# Patient Record
Sex: FEMALE | Race: WHITE | NOT HISPANIC OR LATINO | Employment: FULL TIME | URBAN - METROPOLITAN AREA
[De-identification: names, ages, dates, MRNs, and addresses within clinical notes are randomized per-mention and may not be internally consistent; named-entity substitution may affect disease eponyms.]

---

## 2022-08-08 DIAGNOSIS — N91.2 AMENORRHEA: Primary | ICD-10-CM

## 2022-12-28 ENCOUNTER — TELEPHONE (OUTPATIENT)
Dept: OBGYN CLINIC | Facility: CLINIC | Age: 27
End: 2022-12-28

## 2023-03-01 ENCOUNTER — ANNUAL EXAM (OUTPATIENT)
Dept: OBGYN CLINIC | Facility: CLINIC | Age: 28
End: 2023-03-01

## 2023-03-01 VITALS
BODY MASS INDEX: 20.98 KG/M2 | HEIGHT: 62 IN | SYSTOLIC BLOOD PRESSURE: 110 MMHG | DIASTOLIC BLOOD PRESSURE: 70 MMHG | WEIGHT: 114 LBS

## 2023-03-01 DIAGNOSIS — Z01.419 WOMEN'S ANNUAL ROUTINE GYNECOLOGICAL EXAMINATION: Primary | ICD-10-CM

## 2023-03-01 DIAGNOSIS — N91.4 SECONDARY OLIGOMENORRHEA: ICD-10-CM

## 2023-03-01 RX ORDER — MEDROXYPROGESTERONE ACETATE 10 MG/1
10 TABLET ORAL DAILY
Qty: 5 TABLET | Refills: 3 | Status: SHIPPED | OUTPATIENT
Start: 2023-03-01 | End: 2023-03-02 | Stop reason: ALTCHOICE

## 2023-03-01 NOTE — PROGRESS NOTES
Gabbie Arevalo is a 28 y o  female who presents for annual well woman exam  Periods are irregular, lasting 5 days  No intermenstrual bleeding, spotting, or discharge  Current contraception: none  Trying to conceive      Oligomenorrhea noted, patient tried 1 month of Femara without success with pregnancy  Ultrasound findings reviewed and discussed with patient with possibility of polycystic ovarian syndrome  Option given for cyclic Provera, if not planning on conceiving oral contraceptive pills or if desires to conceive trial of ovulation induction patient desired expectant management for now and she is considering cyclic Provera for withdrawal bleeding      Menstrual History:  OB History        1    Para        Term                AB   1    Living   0       SAB        IAB        Ectopic        Multiple        Live Births                  Patient's last menstrual period was 2023 (exact date)  Period Pattern: (!) Irregular    The following portions of the patient's history were reviewed and updated as appropriate: allergies, current medications, past family history, past medical history, past social history, past surgical history and problem list     Review of Systems  Review of Systems   Constitutional: Negative for activity change, appetite change, chills, fatigue and fever  Respiratory: Negative for apnea, cough, chest tightness and shortness of breath  Cardiovascular: Negative for chest pain, palpitations and leg swelling  Gastrointestinal: Negative for abdominal pain, constipation, diarrhea, nausea and vomiting  Genitourinary: Negative for difficulty urinating, dysuria, flank pain, frequency, hematuria and urgency  Neurological: Negative for dizziness, seizures, syncope, light-headedness, numbness and headaches  Psychiatric/Behavioral: Negative for agitation and confusion            Objective      /70 (BP Location: Left arm, Patient Position: Sitting, Cuff Size: Adult)   Ht 5' 2" (1 575 m)   Wt 51 7 kg (114 lb)   LMP 01/26/2023 (Exact Date)   BMI 20 85 kg/m²     Physical Exam  OBGyn Exam     General:   alert and oriented, in no acute distress, alert, appears stated age and cooperative   Heart: regular rate and rhythm, S1, S2 normal, no murmur, click, rub or gallop   Lungs: clear to auscultation bilaterally   Abdomen: soft, non-tender, without masses or organomegaly   Vulva: normal   Vagina: normal mucosa, normal discharge   Cervix: no cervical motion tenderness and no lesions   Uterus: normal size   Adnexa:  Breast Exam:  normal adnexa  breasts appear normal, no suspicious masses, no skin or nipple changes or axillary nodes  Assessment      @well woman@   26-year-old female  Annual exam  Trying to conceive   Oligomenorrhea  Plan   Pap -2022  Lab results reviewed and discussed with patient  Ultrasound finding reviewed and discussed with patient  Management option for PCOS discussed with patient patient considering cyclic Provera desired expectant management she will try to conceive on her own for the next 3 months if no pregnancy she would call us back with her decision option would be trial of ovulation induction versus referral to [facility]   All questions answered  There are no Patient Instructions on file for this visit

## 2023-03-02 ENCOUNTER — OFFICE VISIT (OUTPATIENT)
Dept: FAMILY MEDICINE CLINIC | Facility: CLINIC | Age: 28
End: 2023-03-02

## 2023-03-02 VITALS
WEIGHT: 113.4 LBS | BODY MASS INDEX: 20.87 KG/M2 | HEART RATE: 76 BPM | SYSTOLIC BLOOD PRESSURE: 108 MMHG | OXYGEN SATURATION: 98 % | DIASTOLIC BLOOD PRESSURE: 67 MMHG | HEIGHT: 62 IN | TEMPERATURE: 97.9 F

## 2023-03-02 DIAGNOSIS — Z00.00 ANNUAL PHYSICAL EXAM: Primary | ICD-10-CM

## 2023-03-02 NOTE — PROGRESS NOTES
[facility]    NAME: Kristi Arias  AGE: 28 y o  SEX: female  : 1995     DATE: 3/2/2023     Assessment and Plan:     Problem List Items Addressed This Visit    None  Visit Diagnoses     Annual physical exam    -  Primary    Relevant Orders    CBC and differential    Comprehensive metabolic panel    Lipid panel          Immunizations and preventive care screenings were discussed with patient today  Appropriate education was printed on patient's after visit summary  Counseling:  · Injury prevention: discussed safety/seat belts, safety helmets, smoke detectors, carbon dioxide detectors, and smoking near bedding or upholstery  Return in about 1 year (around 3/2/2024)  Chief Complaint:     Chief Complaint   Patient presents with   • Annual Exam      History of Present Illness:     Adult Annual Physical   Patient here for a comprehensive physical exam  The patient reports problems - occ chest pain, factory work, no heavy lifting, but twisting and reaching; trying to conceive, lightheaded, runs low BP, some shakiness, picky eater, wonders about iron  fam hx largely unknown, family in [facility], don't go to doctors  5 siblings all healthy  Diet and Physical Activity  · Diet/Nutrition: well balanced diet and consuming 3-5 servings of fruits/vegetables daily  · Exercise: walking, moderate cardiovascular exercise, strength training exercises and 1-2 times a week on average  Depression Screening  PHQ-2/9 Depression Screening         General Health  · Sleep: sleeps well  · Hearing: normal - bilateral   · Vision: goes for regular eye exams and wears glasses  · Dental: regular dental visits  /GYN Health  · Last menstrual period: 23  · Contraceptive method: none, has been off the pill for 2 years, no menses x 1 year until last month    · History of STDs?: no      Review of Systems:     Review of Systems Constitutional: Negative for activity change, fatigue and fever  HENT: Negative for congestion  Eyes: Negative for visual disturbance  Respiratory: Negative for chest tightness and shortness of breath  Cardiovascular: Positive for chest pain (lasts for a few seconds)  Negative for palpitations  Gastrointestinal: Negative for abdominal pain  Genitourinary: Negative for difficulty urinating  Musculoskeletal: Negative for arthralgias  Neurological: Positive for tremors and light-headedness  Negative for headaches  Hematological: Does not bruise/bleed easily  Psychiatric/Behavioral: Negative for sleep disturbance  Past Medical History:     History reviewed  No pertinent past medical history  Past Surgical History:     History reviewed  No pertinent surgical history     Social History:     Social History     Socioeconomic History   • Marital status: Single     Spouse name: None   • Number of children: None   • Years of education: None   • Highest education level: None   Occupational History   • None   Tobacco Use   • Smoking status: Never   • Smokeless tobacco: Never   Vaping Use   • Vaping Use: Never used   Substance and Sexual Activity   • Alcohol use: Not Currently   • Drug use: Never   • Sexual activity: Yes     Partners: Male     Birth control/protection: None     Comment: trying to conceive   Other Topics Concern   • None   Social History Narrative   • None     Social Determinants of Health     Financial Resource Strain: Not on file   Food Insecurity: Not on file   Transportation Needs: Not on file   Physical Activity: Not on file   Stress: Not on file   Social Connections: Not on file   Intimate Partner Violence: Not on file   Housing Stability: Not on file      Family History:     Family History   Problem Relation Age of Onset   • No Known Problems Mother    • No Known Problems Father    • Dementia Paternal Grandmother    • Diabetes Paternal Grandmother    • Diabetes Paternal Grandfather       Current Medications:     No current outpatient medications on file  No current facility-administered medications for this visit  Allergies:     No Known Allergies   Physical Exam:     /67 (BP Location: Right arm, Patient Position: Sitting, Cuff Size: Standard)   Pulse 76   Temp 97 9 °F (36 6 °C)   Ht 5' 2" (1 575 m)   Wt 51 4 kg (113 lb 6 4 oz)   LMP 01/31/2023   SpO2 98%   BMI 20 74 kg/m²     Physical Exam  Constitutional:       Appearance: She is well-developed  HENT:      Right Ear: External ear normal       Left Ear: External ear normal    Eyes:      Conjunctiva/sclera: Conjunctivae normal       Pupils: Pupils are equal, round, and reactive to light  Cardiovascular:      Rate and Rhythm: Normal rate and regular rhythm  Heart sounds: Normal heart sounds  Pulmonary:      Effort: Pulmonary effort is normal       Breath sounds: Normal breath sounds  Abdominal:      General: Bowel sounds are normal       Palpations: Abdomen is soft  Musculoskeletal:         General: Normal range of motion  Cervical back: Normal range of motion and neck supple  Lymphadenopathy:      Cervical: No cervical adenopathy  Skin:     General: Skin is warm  Capillary Refill: Capillary refill takes less than 2 seconds  Neurological:      Mental Status: She is alert and oriented to person, place, and time  Psychiatric:         Behavior: Behavior normal          Thought Content:  Thought content normal          Judgment: Judgment normal           MD Nicanor Rose

## 2023-03-23 ENCOUNTER — APPOINTMENT (OUTPATIENT)
Dept: LAB | Facility: MEDICAL CENTER | Age: 28
End: 2023-03-23

## 2023-03-23 DIAGNOSIS — Z00.00 ANNUAL PHYSICAL EXAM: ICD-10-CM

## 2023-03-23 LAB
ALBUMIN SERPL BCP-MCNC: 4.2 G/DL (ref 3.5–5)
ALP SERPL-CCNC: 49 U/L (ref 46–116)
ALT SERPL W P-5'-P-CCNC: 16 U/L (ref 12–78)
ANION GAP SERPL CALCULATED.3IONS-SCNC: 3 MMOL/L (ref 4–13)
AST SERPL W P-5'-P-CCNC: 11 U/L (ref 5–45)
BASOPHILS # BLD AUTO: 0.07 THOUSANDS/ÂΜL (ref 0–0.1)
BASOPHILS NFR BLD AUTO: 1 % (ref 0–1)
BILIRUB SERPL-MCNC: 1.01 MG/DL (ref 0.2–1)
BUN SERPL-MCNC: 10 MG/DL (ref 5–25)
CALCIUM SERPL-MCNC: 9.3 MG/DL (ref 8.3–10.1)
CHLORIDE SERPL-SCNC: 107 MMOL/L (ref 96–108)
CHOLEST SERPL-MCNC: 178 MG/DL
CO2 SERPL-SCNC: 28 MMOL/L (ref 21–32)
CREAT SERPL-MCNC: 0.73 MG/DL (ref 0.6–1.3)
EOSINOPHIL # BLD AUTO: 0.1 THOUSAND/ÂΜL (ref 0–0.61)
EOSINOPHIL NFR BLD AUTO: 2 % (ref 0–6)
ERYTHROCYTE [DISTWIDTH] IN BLOOD BY AUTOMATED COUNT: 12.7 % (ref 11.6–15.1)
GFR SERPL CREATININE-BSD FRML MDRD: 112 ML/MIN/1.73SQ M
GLUCOSE P FAST SERPL-MCNC: 79 MG/DL (ref 65–99)
HCT VFR BLD AUTO: 43.2 % (ref 34.8–46.1)
HDLC SERPL-MCNC: 78 MG/DL
HGB BLD-MCNC: 14.6 G/DL (ref 11.5–15.4)
IMM GRANULOCYTES # BLD AUTO: 0.02 THOUSAND/UL (ref 0–0.2)
IMM GRANULOCYTES NFR BLD AUTO: 0 % (ref 0–2)
LDLC SERPL CALC-MCNC: 88 MG/DL (ref 0–100)
LYMPHOCYTES # BLD AUTO: 2.29 THOUSANDS/ÂΜL (ref 0.6–4.47)
LYMPHOCYTES NFR BLD AUTO: 43 % (ref 14–44)
MCH RBC QN AUTO: 29.6 PG (ref 26.8–34.3)
MCHC RBC AUTO-ENTMCNC: 33.8 G/DL (ref 31.4–37.4)
MCV RBC AUTO: 87 FL (ref 82–98)
MONOCYTES # BLD AUTO: 0.38 THOUSAND/ÂΜL (ref 0.17–1.22)
MONOCYTES NFR BLD AUTO: 7 % (ref 4–12)
NEUTROPHILS # BLD AUTO: 2.5 THOUSANDS/ÂΜL (ref 1.85–7.62)
NEUTS SEG NFR BLD AUTO: 47 % (ref 43–75)
NONHDLC SERPL-MCNC: 100 MG/DL
NRBC BLD AUTO-RTO: 0 /100 WBCS
PLATELET # BLD AUTO: 305 THOUSANDS/UL (ref 149–390)
PMV BLD AUTO: 10.2 FL (ref 8.9–12.7)
POTASSIUM SERPL-SCNC: 4.1 MMOL/L (ref 3.5–5.3)
PROT SERPL-MCNC: 7.2 G/DL (ref 6.4–8.4)
RBC # BLD AUTO: 4.94 MILLION/UL (ref 3.81–5.12)
SODIUM SERPL-SCNC: 138 MMOL/L (ref 135–147)
TRIGL SERPL-MCNC: 61 MG/DL
WBC # BLD AUTO: 5.36 THOUSAND/UL (ref 4.31–10.16)

## 2023-07-25 ENCOUNTER — TELEPHONE (OUTPATIENT)
Dept: DERMATOLOGY | Facility: CLINIC | Age: 28
End: 2023-07-25

## 2023-11-11 ENCOUNTER — AMB VIDEO VISIT (OUTPATIENT)
Dept: OTHER | Facility: HOSPITAL | Age: 28
End: 2023-11-11

## 2023-11-11 NOTE — CARE ANYWHERE EVISITS
Visit Summary for [facility] [place] . [facility] - Gender: Female - Date of Birth: [id number]  Date: [id number] - Duration: 11 minutes  Patient: [facility] [place] . [facility]  Provider: Pedro Batista    Patient Contact Information  Address  [address]; [address] [facility]  [phone]    Visit Topics  Eye problem [Added By: Self - 2023-11-11]    Triage Questions   What is your current physical address in the event of a medical emergency? Answer []  Are you allergic to any medications? Answer []  Are you now or could you be pregnant? Answer []  Do you have any immune system compromise or chronic lung   disease? Answer []  Do you have any vulnerable family members in the home (infant, pregnant, cancer, elderly)? Answer []     Conversation Transcripts  [0A][0A] [Notification] You are connected with Eva Delgadillo, Adult Medicine.[0A][Notification] Jeni Gonzalez is located in [address] has shared health history. Kailyn Loya .[0A][Notification] Eva Pablo has added a   diagnosis/procedure code. [0A][Notification] Eva Pablo has added a prescription. [0A]    Diagnosis  Hordeolum externum right eye, unspecified eyelid    Procedures    Medications Prescribed    erythromycin  Dose : 1 CM  Route : ophthalmic (eye)  Frequency : 4 times a day. Until directed to stop.      Refills : 0  Instructions to the Pharmacist : Substitutions allowed      Provider Notes  [0A][0A]  Mode of Communication:  Geraldine Alaniz verified name, date of birth and location[0A]History: States her eye is swollen and there is a bump in her lower inner eyelid; states this began two days; denies visual changes or visual loss; denies   discharge; it is painful when she touches it; she has not had this before; she is not pregnant or breast feeding[0A][0A]Past medical history: None[0A]Medications: None[0A]Allergies: None[0A]Last menstrual period/pregnant (if applicable): 1 ACGJU[3Z]FWCV:   [0A][0A]Vitals signs (if available): [0A][0A]Weight: 114 ibs and 5 foot 2[0A]General: Alert and oriented x 3; no acute distress; verbalizing [id number][6Z]OIURI: normocephalic, atraumatic, eomi, mucous membranes moist[0A]Resp: non labored   respirations, no audible wheezing[0A]Skin: no lesions or rashes[0A]Assessment: Hordeoleum of right lower eyelid[0A]Diagnosis code: [0A]Plan:  [0A]    Medications: [0A]Erythromycin ointment[0A]    Home care: [0A]Apply hot compresses several times per day;   wash eye with baby soap 15 times per day[0A]    Referral or follow up: [0A]If no improvement go to optometrist or urgent care[0A]    Medical decision making:  [0A]    Antibiotic choice (if applicable):   [5B]TQULOHAGUZ recommendations: [0A]    If you   received a prescription at this visit and you have a question or problem please call [phone] for prescription assistance [0A]    Please print a copy of this note and send it to your regular doctor, or take it to your next visit so it may be included   in your medical record [0A]    Please see your primary care provider on an annual basis or more frequently if directed [0A]The patient voiced understanding and agreement with plan. [0A]    Electronically signed by: Luisa Jordan(NPI [id number])

## 2023-11-11 NOTE — CARE ANYWHERE EVISITS
Visit Summary for [facility] [place] . [facility] - Gender: Female - Date of Birth: [id number]  Date: [id number] - Duration: 11 minutes  Patient: [facility] [place] . [facility]  Provider: Reinaldo Colindres    Patient Contact Information  Address  [address]; [address] [facility] [place]  [phone]    Visit Topics  Eye problem [Added By: Self - 2023-11-11]    Triage Questions   What is your current physical address in the event of a medical emergency? Answer []  Are you allergic to any medications? Answer []  Are you now or could you be pregnant? Answer []  Do you have any immune system compromise or chronic lung   disease? Answer []  Do you have any vulnerable family members in the home (infant, pregnant, cancer, elderly)? Answer []     Conversation Transcripts  [0A][0A] [Notification] You are connected with [facility]. Leona, Adult Medicine.[0A][Notification] Elvin Sanabria is located in [address] [place] [address] Road has shared health history. [facility] .[0A][Notification] [facility]. Shandra Espitia has added a   diagnosis/procedure code. [0A][Notification] [facility] Shandra Espitia has added a prescription. [0A]    Diagnosis  Hordeolum externum right eye, unspecified eyelid    Procedures    Medications Prescribed    erythromycin  Dose : 1 CM  Route : ophthalmic (eye)  Frequency : 4 times a day. Until directed to stop.      Refills : 0  Instructions to the Pharmacist : Substitutions allowed      Provider Notes  [0A][0A]        [0A] Mode of Communication:  Nataly Devoid verified name, date of birth and location[0A]History: States her eye is swollen and there is a bump in her lower inner eyelid; states this began two days; denies visual changes or visual loss;   denies discharge; it is painful when she touches it; she has not had this before; she is not pregnant or breast feeding[0A][0A]Past medical history: None[0A]Medications: None[0A]Allergies: None[0A]Last menstrual period/pregnant (if applicable): 1   [facility][0W]NLDI: [0A][0A]Vitals signs (if available):  [0A][0A]Weight: 114 ibs and 5 foot 2[0A]General: Alert and oriented x 3; no acute distress; verbalizing [id number][5M]IHUDK: normocephalic, atraumatic, eomi, mucous membranes moist[0A]Resp: non   labored respirations, no audible wheezing[0A]Skin: no lesions or rashes[0A]Assessment: Hordeoleum of right lower eyelid[0A]Diagnosis code: [0A]Plan:  [0A]    Medications: [0A]Erythromycin ointment[0A]    Home care: [0A]Apply hot compresses several times   per day; wash eye with baby soap 15 times per day[0A]    Referral or follow up: [0A]If no improvement go to optometrist or urgent care[0A]    Medical decision making:  [0A]    Antibiotic choice (if applicable):   [7G]TLPFZPMLCJ recommendations: [0A]      If you received a prescription at this visit and you have a question or problem please call [phone] for prescription assistance [0A]    Please print a copy of this note and send it to your regular doctor, or take it to your next visit so it may be   included in your medical record [0A]    Please see your primary care provider on an annual basis or more frequently if directed [0A]The patient voiced understanding and agreement with plan. [0A]      Addendum added on 11/11/2023 09:12 AM ESTPlan:   Prescription personally called in to [address] [place] [address] on 11/11/2023[0A]    Electronically signed by: Luisa Pal(NPI [id number])

## 2024-01-15 ENCOUNTER — TELEPHONE (OUTPATIENT)
Dept: OBGYN CLINIC | Facility: CLINIC | Age: 29
End: 2024-01-15

## 2024-01-16 DIAGNOSIS — N91.2 AMENORRHEA: Primary | ICD-10-CM

## 2024-03-07 ENCOUNTER — APPOINTMENT (OUTPATIENT)
Dept: LAB | Facility: MEDICAL CENTER | Age: 29
End: 2024-03-07
Payer: COMMERCIAL

## 2024-03-07 ENCOUNTER — ANNUAL EXAM (OUTPATIENT)
Dept: OBGYN CLINIC | Facility: MEDICAL CENTER | Age: 29
End: 2024-03-07
Payer: COMMERCIAL

## 2024-03-07 VITALS
HEIGHT: 62 IN | WEIGHT: 115 LBS | DIASTOLIC BLOOD PRESSURE: 60 MMHG | BODY MASS INDEX: 21.16 KG/M2 | SYSTOLIC BLOOD PRESSURE: 110 MMHG

## 2024-03-07 DIAGNOSIS — Z01.419 WELL WOMAN EXAM WITH ROUTINE GYNECOLOGICAL EXAM: Primary | ICD-10-CM

## 2024-03-07 DIAGNOSIS — N91.2 AMENORRHEA: ICD-10-CM

## 2024-03-07 PROCEDURE — S0612 ANNUAL GYNECOLOGICAL EXAMINA: HCPCS | Performed by: PHYSICIAN ASSISTANT

## 2024-03-07 NOTE — PROGRESS NOTES
Patient presents for a routine annual visit  Last Pap Smear- 2022 neg  HPV vaccine- completed   LMP- 24 - irregular   Birth control- none - trying to conceive     nonSmoker  Currently sexually active - one partner   STD testing - declines   No family history of uterine, ovarian, cervical or breast cancer  No concerns/questions for today's visit

## 2024-03-07 NOTE — PROGRESS NOTES
Assessment   28 y.o.  presenting for annual exam.     Plan   Diagnoses and all orders for this visit:    Well woman exam with routine gynecological exam        Pap up to date  TTC- continue to follow with SGF    Perineal hygiene reviewed. Weight bearing exercises minium of 150 mins/weekly advised. Kegel exercises recommended.   SBE encouraged, A yearly mammogram is recommended for breast cancer screening starting at age 40. ASCCP guidelines reviewed. Condoms encouraged with all sexual activity to prevent STI's. Gardisil vaccines recommended up to age 45. Calcium/ Vit D dietary requirements discussed.   Advised to call with any issues, all concerns & questions addressed.   See provided information in your after visit summary     F/U Annually and PRN    Results will be released to [facility], if abnormal will call or message to review and discuss treatment plan.     __________________________________________________________________    Subjective     Christian Ellis is a 28 y.o.  presenting for annual exam. She is without complaint and does not want STD testing today.     Completing initial blood work and work up with F.     Longstanding history of oligomenorrheic cycles. Cycles present every 30-90 days on average. States she went almost a full year without a menses in [id number]    SCREENING  Last Pap: 3/23/2022 negative      GYN  Sexually active: Yes - single partner - male  Contraception: TTC  Hx STI: denies     Hx Abnormal pap: denies  We reviewed ASCCP guidelines for Pap testing today.    Denies vaginal discharge, itching, odor, dyspareunia, pelvic pain and vulvar/vaginal symptoms      OB           Complaints: denies   Denies urgency, frequency, hematuria, leakage / change in stream, difficulty urinating.       BREAST  Complaints: denies   Denies: breast lump, breast tenderness, nipple discharge, skin color change, and skin lesion(s)      Pertinent Family Hx:   Family hx of breast cancer:  "no  Family hx of ovarian cancer: no  Family hx of colon cancer: no      GENERAL  PMH reviewed/updated and is as below.   Patient does follow with a PCP.    SOCIAL  Smoking: no  Alcohol:no  Drug: no  Occupation: lead at manufacturing company       History reviewed. No pertinent past medical history.    History reviewed. No pertinent surgical history.    No current outpatient medications on file.    No Known Allergies    Social History     Socioeconomic History    Marital status: /Civil Union     Spouse name: Not on file    Number of children: Not on file    Years of education: Not on file    Highest education level: Not on file   Occupational History    Not on file   Tobacco Use    Smoking status: Never    Smokeless tobacco: Never   Vaping Use    Vaping status: Never Used   Substance and Sexual Activity    Alcohol use: Never    Drug use: Never    Sexual activity: Yes     Partners: Male     Birth control/protection: None     Comment: trying to conceive   Other Topics Concern    Not on file   Social History Narrative    Not on file     Social Determinants of Health     Financial Resource Strain: Not on file   Food Insecurity: Not on file   Transportation Needs: Not on file   Physical Activity: Not on file   Stress: Not on file   Social Connections: Not on file   Intimate Partner Violence: Not on file   Housing Stability: Not on file       Review of Systems     ROS:  Constitutional: Negative for fatigue and unexpected weight change.   Respiratory: Negative for cough and shortness of breath.    Cardiovascular: Negative for chest pain and palpitations.   Gastrointestinal: Negative for abdominal pain and change in bowel habits  Breasts:  Negative, other than as noted above.   Genitourinary: Negative, other than as noted above.   Psychiatric: Negative for mood difficulties.      Objective      /60 (BP Location: Left arm, Patient Position: Sitting, Cuff Size: Standard)   Ht 5' 1.5\" (1.562 m)   Wt 52.2 kg (115 " lb)   LMP 02/19/2024 (Exact Date)   BMI 21.38 kg/m²     Physical Examination:    Patient appears well and is not in distress  Neck is supple without masses, no cervical or supraclavicular lymphadenopathy  Cardiovascular: regular rate and rhythm; no murmurs  Lungs: clear to auscultation bilaterally; no wheezes  Breasts are symmetrical without mass, tenderness, nipple discharge, skin changes or adenopathy.   Abdomen is soft and nontender without masses.   External genitals are normal without lesions or rashes.  Urethral meatus and urethra are normal  Bladder is normal to palpation  Vagina is normal without discharge or bleeding.   Cervix is normal without discharge or lesion.   Uterus is normal, mobile, nontender without palpable mass.  Adnexa are normal, nontender, without palpable mass.

## 2024-03-21 ENCOUNTER — APPOINTMENT (OUTPATIENT)
Dept: LAB | Facility: MEDICAL CENTER | Age: 29
End: 2024-03-21
Payer: COMMERCIAL

## 2024-03-21 DIAGNOSIS — Z01.83 ENCOUNTER FOR BLOOD TYPING: ICD-10-CM

## 2024-03-21 DIAGNOSIS — Z11.59 SCREENING EXAMINATION FOR RUBELLA: ICD-10-CM

## 2024-03-21 DIAGNOSIS — Z31.41 FERTILITY TESTING: ICD-10-CM

## 2024-03-21 DIAGNOSIS — Z11.4 SCREENING FOR HIV (HUMAN IMMUNODEFICIENCY VIRUS): ICD-10-CM

## 2024-03-21 DIAGNOSIS — E28.2 POLYCYSTIC OVARIAN DISEASE: ICD-10-CM

## 2024-03-21 DIAGNOSIS — Z13.0 SCREENING FOR IRON DEFICIENCY ANEMIA: ICD-10-CM

## 2024-03-21 DIAGNOSIS — Z11.3 SCREENING FOR STDS (SEXUALLY TRANSMITTED DISEASES): ICD-10-CM

## 2024-03-21 DIAGNOSIS — Z13.228 SCREENING FOR CYSTIC FIBROSIS: ICD-10-CM

## 2024-03-21 LAB
ABO GROUP BLD: NORMAL
ALBUMIN SERPL BCP-MCNC: 4.7 G/DL (ref 3.5–5)
ALP SERPL-CCNC: 43 U/L (ref 34–104)
ALT SERPL W P-5'-P-CCNC: 8 U/L (ref 7–52)
ANION GAP SERPL CALCULATED.3IONS-SCNC: 7 MMOL/L (ref 4–13)
AST SERPL W P-5'-P-CCNC: 13 U/L (ref 13–39)
BILIRUB SERPL-MCNC: 0.88 MG/DL (ref 0.2–1)
BUN SERPL-MCNC: 10 MG/DL (ref 5–25)
CALCIUM SERPL-MCNC: 9.3 MG/DL (ref 8.4–10.2)
CHLORIDE SERPL-SCNC: 105 MMOL/L (ref 96–108)
CHOLEST SERPL-MCNC: 175 MG/DL
CO2 SERPL-SCNC: 28 MMOL/L (ref 21–32)
CREAT SERPL-MCNC: 0.75 MG/DL (ref 0.6–1.3)
ERYTHROCYTE [DISTWIDTH] IN BLOOD BY AUTOMATED COUNT: 12.5 % (ref 11.6–15.1)
GFR SERPL CREATININE-BSD FRML MDRD: 108 ML/MIN/1.73SQ M
GLUCOSE P FAST SERPL-MCNC: 78 MG/DL (ref 65–99)
HBV SURFACE AG SER QL: NORMAL
HCT VFR BLD AUTO: 42.5 % (ref 34.8–46.1)
HDLC SERPL-MCNC: 68 MG/DL
HGB BLD-MCNC: 14.1 G/DL (ref 11.5–15.4)
HIV 1+2 AB+HIV1 P24 AG SERPL QL IA: NORMAL
HIV 2 AB SERPL QL IA: NORMAL
HIV1 AB SERPL QL IA: NORMAL
HIV1 P24 AG SERPL QL IA: NORMAL
INSULIN SERPL-ACNC: 3.6 UIU/ML (ref 1.9–23)
LDLC SERPL CALC-MCNC: 89 MG/DL (ref 0–100)
MCH RBC QN AUTO: 30 PG (ref 26.8–34.3)
MCHC RBC AUTO-ENTMCNC: 33.2 G/DL (ref 31.4–37.4)
MCV RBC AUTO: 90 FL (ref 82–98)
NONHDLC SERPL-MCNC: 107 MG/DL
PLATELET # BLD AUTO: 322 THOUSANDS/UL (ref 149–390)
PMV BLD AUTO: 10.1 FL (ref 8.9–12.7)
POTASSIUM SERPL-SCNC: 4.1 MMOL/L (ref 3.5–5.3)
PROLACTIN SERPL-MCNC: 8.63 NG/ML (ref 3.34–26.72)
PROT SERPL-MCNC: 6.8 G/DL (ref 6.4–8.4)
RBC # BLD AUTO: 4.7 MILLION/UL (ref 3.81–5.12)
RH BLD: POSITIVE
RUBV IGG SERPL IA-ACNC: 32.4 IU/ML
SODIUM SERPL-SCNC: 140 MMOL/L (ref 135–147)
TREPONEMA PALLIDUM IGG+IGM AB [PRESENCE] IN SERUM OR PLASMA BY IMMUNOASSAY: NORMAL
TRIGL SERPL-MCNC: 89 MG/DL
TSH SERPL DL<=0.05 MIU/L-ACNC: 1.97 UIU/ML (ref 0.45–4.5)
VZV IGG SER QL IA: NORMAL
WBC # BLD AUTO: 5.27 THOUSAND/UL (ref 4.31–10.16)

## 2024-03-21 PROCEDURE — 86787 VARICELLA-ZOSTER ANTIBODY: CPT

## 2024-03-21 PROCEDURE — 86901 BLOOD TYPING SEROLOGIC RH(D): CPT

## 2024-03-21 PROCEDURE — 87491 CHLMYD TRACH DNA AMP PROBE: CPT

## 2024-03-21 PROCEDURE — 84443 ASSAY THYROID STIM HORMONE: CPT

## 2024-03-21 PROCEDURE — 84403 ASSAY OF TOTAL TESTOSTERONE: CPT

## 2024-03-21 PROCEDURE — 83036 HEMOGLOBIN GLYCOSYLATED A1C: CPT

## 2024-03-21 PROCEDURE — 83525 ASSAY OF INSULIN: CPT

## 2024-03-21 PROCEDURE — 82166 ASSAY ANTI-MULLERIAN HORM: CPT

## 2024-03-21 PROCEDURE — 84402 ASSAY OF FREE TESTOSTERONE: CPT

## 2024-03-21 PROCEDURE — 86803 HEPATITIS C AB TEST: CPT

## 2024-03-21 PROCEDURE — 84146 ASSAY OF PROLACTIN: CPT

## 2024-03-21 PROCEDURE — 80053 COMPREHEN METABOLIC PANEL: CPT

## 2024-03-21 PROCEDURE — 80061 LIPID PANEL: CPT

## 2024-03-21 PROCEDURE — 36415 COLL VENOUS BLD VENIPUNCTURE: CPT

## 2024-03-21 PROCEDURE — 85027 COMPLETE CBC AUTOMATED: CPT

## 2024-03-21 PROCEDURE — 82627 DEHYDROEPIANDROSTERONE: CPT

## 2024-03-21 PROCEDURE — 86900 BLOOD TYPING SEROLOGIC ABO: CPT

## 2024-03-21 PROCEDURE — 87389 HIV-1 AG W/HIV-1&-2 AB AG IA: CPT

## 2024-03-21 PROCEDURE — 87340 HEPATITIS B SURFACE AG IA: CPT

## 2024-03-21 PROCEDURE — 86762 RUBELLA ANTIBODY: CPT

## 2024-03-21 PROCEDURE — 83498 ASY HYDROXYPROGESTERONE 17-D: CPT

## 2024-03-21 PROCEDURE — 87591 N.GONORRHOEAE DNA AMP PROB: CPT

## 2024-03-21 PROCEDURE — 86780 TREPONEMA PALLIDUM: CPT

## 2024-03-22 LAB
C TRACH DNA SPEC QL NAA+PROBE: NEGATIVE
DHEA-S SERPL-MCNC: 179 UG/DL (ref 84.8–378)
EST. AVERAGE GLUCOSE BLD GHB EST-MCNC: 74 MG/DL
HBA1C MFR BLD: 4.2 %
HCV AB S/CO SERPL IA: NON REACTIVE
N GONORRHOEA DNA SPEC QL NAA+PROBE: NEGATIVE
SL AMB INTERPRETATION: NORMAL
TESTOST FREE SERPL-MCNC: 1.7 PG/ML (ref 0–4.2)
TESTOST SERPL-MCNC: 57 NG/DL (ref 13–71)

## 2024-03-25 LAB
17OHP SERPL-MCNC: 99 NG/DL
MIS SERPL-MCNC: 8.48 NG/ML

## 2024-04-04 ENCOUNTER — APPOINTMENT (OUTPATIENT)
Dept: LAB | Facility: MEDICAL CENTER | Age: 29
End: 2024-04-04
Payer: COMMERCIAL

## 2024-04-04 ENCOUNTER — OFFICE VISIT (OUTPATIENT)
Dept: FAMILY MEDICINE CLINIC | Facility: CLINIC | Age: 29
End: 2024-04-04
Payer: COMMERCIAL

## 2024-04-04 VITALS
BODY MASS INDEX: 21.02 KG/M2 | HEIGHT: 62 IN | DIASTOLIC BLOOD PRESSURE: 60 MMHG | OXYGEN SATURATION: 99 % | WEIGHT: 114.2 LBS | HEART RATE: 82 BPM | TEMPERATURE: 97.6 F | SYSTOLIC BLOOD PRESSURE: 108 MMHG

## 2024-04-04 DIAGNOSIS — G89.29 CHRONIC MIDLINE LOW BACK PAIN WITHOUT SCIATICA: Primary | ICD-10-CM

## 2024-04-04 DIAGNOSIS — M54.50 CHRONIC MIDLINE LOW BACK PAIN WITHOUT SCIATICA: Primary | ICD-10-CM

## 2024-04-04 DIAGNOSIS — B36.0 TINEA VERSICOLOR: ICD-10-CM

## 2024-04-04 DIAGNOSIS — Z31.41 FERTILITY TESTING: ICD-10-CM

## 2024-04-04 DIAGNOSIS — M72.2 PLANTAR FASCIITIS, BILATERAL: ICD-10-CM

## 2024-04-04 LAB — BLD GP AB SCN SERPL QL: NEGATIVE

## 2024-04-04 PROCEDURE — 86850 RBC ANTIBODY SCREEN: CPT

## 2024-04-04 PROCEDURE — 99214 OFFICE O/P EST MOD 30 MIN: CPT | Performed by: FAMILY MEDICINE

## 2024-04-04 PROCEDURE — 36415 COLL VENOUS BLD VENIPUNCTURE: CPT

## 2024-04-04 RX ORDER — ITRACONAZOLE 100 MG/1
200 CAPSULE ORAL DAILY
Qty: 28 CAPSULE | Refills: 0 | Status: SHIPPED | OUTPATIENT
Start: 2024-04-04 | End: 2024-04-04 | Stop reason: SDUPTHER

## 2024-04-04 RX ORDER — ITRACONAZOLE 100 MG/1
200 CAPSULE ORAL DAILY
Qty: 14 CAPSULE | Refills: 0 | Status: SHIPPED | OUTPATIENT
Start: 2024-04-04 | End: 2024-04-11

## 2024-04-04 NOTE — PROGRESS NOTES
Assessment/Plan:    1. Chronic midline low back pain without sciatica    2. Plantar fasciitis, bilateral    3. Tinea versicolor  -     itraconazole (SPORANOX) 100 mg capsule; Take 2 capsules (200 mg total) by mouth daily for 7 days        There are no Patient Instructions on file for this visit.    No follow-ups on file.    Subjective:      Patient ID: Christian Ellis is a 28 y.o. female.    Chief Complaint   Patient presents with    Back Pain     Needs a note for work       Here for note from work. Her dept recently took away their chairs 4 days ago. She works for manufacturing company. She is being asked to stand on her feet for 12 hours, 3-4 days a week. She has no issues with work productivity, the move was in response to other employees not finishing tasks. No more employees are calling out and the job is even more demanding. Notes pain in her back and in her heels, especially when standing for the first time in the morning. Will be starting IUI hopefully in the next few months. Notes rash on the belly that does not tan,  has similar lesions and they have a dog. No scaling.     Back Pain  This is a new problem. The current episode started in the past 7 days. The problem occurs intermittently. The problem has been waxing and waning since onset. The pain is present in the lumbar spine. The quality of the pain is described as aching and cramping. The pain does not radiate. The pain is at a severity of 5/10. The pain is Worse during the day. The symptoms are aggravated by standing. Stiffness is present All day. Pertinent negatives include no abdominal pain, bladder incontinence, bowel incontinence, chest pain, dysuria, fever, headaches, leg pain, numbness, paresis, paresthesias, pelvic pain, perianal numbness, tingling, weakness or weight loss.       The following portions of the patient's history were reviewed and updated as appropriate: allergies, current medications, past family history, past medical  "history, past social history, past surgical history and problem list.    Review of Systems   Constitutional:  Negative for fatigue, fever and weight loss.   HENT:  Negative for congestion.    Eyes:  Negative for visual disturbance.   Respiratory:  Negative for chest tightness and shortness of breath.    Cardiovascular:  Negative for chest pain and palpitations.   Gastrointestinal:  Negative for abdominal pain and bowel incontinence.   Genitourinary:  Negative for bladder incontinence, difficulty urinating, dysuria and pelvic pain.   Musculoskeletal:  Positive for back pain. Negative for arthralgias.        Heel pain   Skin:  Positive for rash.   Neurological:  Negative for tingling, weakness, numbness, headaches and paresthesias.   Hematological:  Does not bruise/bleed easily.         Current Outpatient Medications   Medication Sig Dispense Refill    itraconazole (SPORANOX) 100 mg capsule Take 2 capsules (200 mg total) by mouth daily for 7 days 14 capsule 0     No current facility-administered medications for this visit.       Objective:    /60 (BP Location: Left arm, Patient Position: Sitting, Cuff Size: Standard)   Pulse 82   Temp 97.6 °F (36.4 °C) (Temporal)   Ht 5' 1.5\" (1.562 m)   Wt 51.8 kg (114 lb 3.2 oz)   LMP 02/19/2024 (Exact Date)   SpO2 99%   BMI 21.23 kg/m²        Physical Exam  Vitals reviewed.   Constitutional:       Appearance: Normal appearance. She is well-developed.   Cardiovascular:      Rate and Rhythm: Normal rate.   Pulmonary:      Effort: Pulmonary effort is normal.   Musculoskeletal:         General: No tenderness.      Right lower leg: No edema.      Left lower leg: No edema.      Comments: No heel tenderness elicited on exam, strength intact   Skin:     General: Skin is warm.   Neurological:      General: No focal deficit present.      Mental Status: She is alert and oriented to person, place, and time.   Psychiatric:         Mood and Affect: Mood normal.         Behavior: " Behavior normal.         Thought Content: Thought content normal.         Judgment: Judgment normal.                Anni Yen MD

## 2024-05-16 ENCOUNTER — RA CDI HCC (OUTPATIENT)
Dept: OTHER | Facility: HOSPITAL | Age: 29
End: 2024-05-16

## 2024-05-16 NOTE — PROGRESS NOTES
HCC coding opportunities       Chart reviewed, no opportunity found: CHART REVIEWED, NO OPPORTUNITY FOUND        Patients Insurance        Commercial Insurance: [employer]       
DC instructions

## 2024-06-24 ENCOUNTER — TELEPHONE (OUTPATIENT)
Age: 29
End: 2024-06-24

## 2024-06-24 NOTE — TELEPHONE ENCOUNTER
Established pt called reporting new pregnancy. Pt sees fertility specialist & conceived via IUI, WINSTON 2/8/25; pt released by fertility clinic as of 7/10. Pt requests to establish ob care with [facility], advised pt will be seen by all delivering physicians & AP's, pt verbalized understanding.   No Dating appts available until 7/22 at [facility]. Attempted warm transfer. Please reach out to pt if sooner appt can be made since pt will >11w at that time or if ob Intake is preferred first visit.

## 2024-07-18 ENCOUNTER — APPOINTMENT (OUTPATIENT)
Dept: LAB | Facility: MEDICAL CENTER | Age: 29
End: 2024-07-18
Payer: COMMERCIAL

## 2024-07-18 DIAGNOSIS — N97.9 PRIMARY FEMALE INFERTILITY: ICD-10-CM

## 2024-07-18 LAB — B-HCG SERPL-ACNC: 812.5 MIU/ML (ref 0–5)

## 2024-07-18 PROCEDURE — 84702 CHORIONIC GONADOTROPIN TEST: CPT

## 2024-07-18 PROCEDURE — 36415 COLL VENOUS BLD VENIPUNCTURE: CPT

## 2024-07-25 ENCOUNTER — APPOINTMENT (OUTPATIENT)
Dept: LAB | Facility: MEDICAL CENTER | Age: 29
End: 2024-07-25
Payer: COMMERCIAL

## 2024-07-25 DIAGNOSIS — N97.9 PRIMARY FEMALE INFERTILITY: ICD-10-CM

## 2024-07-25 LAB — B-HCG SERPL-ACNC: 109.3 MIU/ML (ref 0–5)

## 2024-07-25 PROCEDURE — 36415 COLL VENOUS BLD VENIPUNCTURE: CPT

## 2024-07-25 PROCEDURE — 84702 CHORIONIC GONADOTROPIN TEST: CPT

## 2024-08-01 ENCOUNTER — APPOINTMENT (OUTPATIENT)
Dept: LAB | Facility: MEDICAL CENTER | Age: 29
End: 2024-08-01
Payer: COMMERCIAL

## 2024-08-01 DIAGNOSIS — N97.9 PRIMARY FEMALE INFERTILITY: ICD-10-CM

## 2024-08-01 LAB — B-HCG SERPL-ACNC: 30.3 MIU/ML (ref 0–5)

## 2024-08-01 PROCEDURE — 36415 COLL VENOUS BLD VENIPUNCTURE: CPT

## 2024-08-01 PROCEDURE — 84702 CHORIONIC GONADOTROPIN TEST: CPT

## 2024-08-08 ENCOUNTER — APPOINTMENT (OUTPATIENT)
Dept: LAB | Facility: MEDICAL CENTER | Age: 29
End: 2024-08-08
Payer: COMMERCIAL

## 2024-08-08 DIAGNOSIS — N97.9 PRIMARY FEMALE INFERTILITY: ICD-10-CM

## 2024-08-08 LAB — B-HCG SERPL-ACNC: 18.3 MIU/ML (ref 0–5)

## 2024-08-08 PROCEDURE — 36415 COLL VENOUS BLD VENIPUNCTURE: CPT

## 2024-08-08 PROCEDURE — 84702 CHORIONIC GONADOTROPIN TEST: CPT

## 2024-08-15 ENCOUNTER — APPOINTMENT (OUTPATIENT)
Dept: LAB | Facility: MEDICAL CENTER | Age: 29
End: 2024-08-15
Payer: COMMERCIAL

## 2024-08-15 DIAGNOSIS — N97.9 PRIMARY FEMALE INFERTILITY: ICD-10-CM

## 2024-08-15 LAB — B-HCG SERPL-ACNC: 10.5 MIU/ML (ref 0–5)

## 2024-08-15 PROCEDURE — 36415 COLL VENOUS BLD VENIPUNCTURE: CPT

## 2024-08-15 PROCEDURE — 84702 CHORIONIC GONADOTROPIN TEST: CPT

## 2024-08-27 ENCOUNTER — APPOINTMENT (OUTPATIENT)
Dept: LAB | Facility: MEDICAL CENTER | Age: 29
End: 2024-08-27
Payer: COMMERCIAL

## 2024-08-27 DIAGNOSIS — N97.9 PRIMARY FEMALE INFERTILITY: ICD-10-CM

## 2024-08-27 LAB — B-HCG SERPL-ACNC: 4.6 MIU/ML (ref 0–5)

## 2024-08-27 PROCEDURE — 36415 COLL VENOUS BLD VENIPUNCTURE: CPT

## 2024-08-27 PROCEDURE — 84702 CHORIONIC GONADOTROPIN TEST: CPT

## 2024-11-18 ENCOUNTER — TELEPHONE (OUTPATIENT)
Age: 29
End: 2024-11-18

## 2024-11-26 ENCOUNTER — INITIAL PRENATAL (OUTPATIENT)
Age: 29
End: 2024-11-26

## 2024-11-26 ENCOUNTER — TELEPHONE (OUTPATIENT)
Age: 29
End: 2024-11-26

## 2024-11-26 ENCOUNTER — PATIENT MESSAGE (OUTPATIENT)
Age: 29
End: 2024-11-26

## 2024-11-26 VITALS
WEIGHT: 125 LBS | HEART RATE: 80 BPM | BODY MASS INDEX: 23 KG/M2 | HEIGHT: 62 IN | SYSTOLIC BLOOD PRESSURE: 111 MMHG | DIASTOLIC BLOOD PRESSURE: 73 MMHG

## 2024-11-26 DIAGNOSIS — Z31.430 ENCOUNTER OF FEMALE FOR TESTING FOR GENETIC DISEASE CARRIER STATUS FOR PROCREATIVE MANAGEMENT: ICD-10-CM

## 2024-11-26 DIAGNOSIS — Z36.9 ENCOUNTER FOR ANTENATAL SCREENING: ICD-10-CM

## 2024-11-26 DIAGNOSIS — Z34.81 PRENATAL CARE, SUBSEQUENT PREGNANCY, FIRST TRIMESTER: Primary | ICD-10-CM

## 2024-11-26 DIAGNOSIS — E28.2 PCOS (POLYCYSTIC OVARIAN SYNDROME): ICD-10-CM

## 2024-11-26 PROCEDURE — OBC

## 2024-11-26 RX ORDER — PROGESTERONE 200 MG/1
200 CAPSULE ORAL
COMMUNITY
End: 2024-11-29

## 2024-11-26 NOTE — TELEPHONE ENCOUNTER
Patient has referral for MFM states she has a  small subchorionic hematoma and needs to be seen sooner than later.  Patient requesting [place] and a female .

## 2024-11-26 NOTE — PROGRESS NOTES
OB INTAKE INTERVIEW  Patient is 29 y.o. who presents for OB intake at 9 4/7wks  She is accompanied by herself during this encounter  The father of her baby Jeovanny Ellis is involved in the pregnancy and is 28 years old.      Last Menstrual Period: Unknown THIS IS AN IUI PREGNANCY. Date of IUI 10/4/2024  Ultrasound: Measured 8 weeks 6 days on 24  Estimated Date of Delivery: 2025 confirmed by 8 week US at [place].    Signs/Symptoms of Pregnancy  Current pregnancy symptoms: Nausea   Constipation YES  Headaches no  Cramping/spotting YES- sometimes mild cramps  PICA cravings no    Diabetes-  Body mass index is 22.86 kg/m².  If patient has 1 or more, please order early 1 hour GTT  History of GDM no  BMI >35 no  History of PCOS or current metformin use YES  History of LGA/macrosomic infant (4000g/9lbs) no    If patient has 2 or more, please order early 1 hour GTT  BMI>30 no  AMA no  First degree relative with type 2 diabetes no  History of chronic HTN, hyperlipidemia, elevated A1C no  High risk race (, , ,  or ) no    Hypertension- if you answer yes to any of the following, please order baseline preeclampsia labs (cbc, comprehensive metabolic panel, urine protein creatinine ratio, uric acid)  History of of chronic HTN no  History of gestational HTN no  History of preeclampsia, eclampsia, or HELLP syndrome no  History of diabetes no  History of lupus,sjogrens syndrome, kidney disease no    Thyroid- if yes order TSH with reflex T4  History of thyroid disease no    Bleeding Disorder or Hx of DVT-patient or first degree relative with history of. Order the following if not done previously.   (Factor V, antithrombin III, prothrombin gene mutation, protein C and S Ag, lupus anticoagulant, anticardiolipin, beta-2 glycoprotein)   no    OB/GYN-  History of abnormal pap smear no       Date of last pap smear 3/23/22  History of HPV no  History of  Herpes/HSV no  History of other STI (gonorrhea, chlamydia, trich) no  History of prior  no  History of prior  no  History of  delivery prior to 36 weeks 6 days no  History of Varicella or Vaccination Had varicella  History of blood transfusion no  Ok for blood transfusion YES    Substance screening-   History of tobacco use no  Currently using tobacco no  Substance Use Screen Level (N/A, LOW, HIGH) N/A    MRSA Screening-   Does the pt have a hx of MRSA? no    Immunizations:  Influenza vaccine given this season Not interested  Discussed Tdap vaccine YES  Discussed COVID Vaccine YES    Genetic/MFM-  Do you or your partner have a history of any of the following in yourselves or first degree relatives?  Cystic fibrosis no  Spinal muscular atrophy YES- FOB's nephew has SMA and FOB's 2 sisters and niece are carriers. FOB is negative.  Hemoglobinopathy/Sickle Cell/Thalassemia no  Fragile X Intellectual Disability no    If yes, discuss Carrier Screening and recommend consultation with MFM/Genetic Counseling and place specific [facility] Referral for.    If no, discuss Carrier Screening being completed once in a lifetime as a standard of care lab test. Place orders for Cystic Fibrosis Gene Test (ROF088) and Spinal Muscular Atrophy DNA ([id number])      Appointment for Nuchal Translucency Ultrasound at [facility] scheduled for Referral was placed and Patient has phone number to call as well.      Interview education  [facility] Pregnancy Essentials Book reviewed, discussed and attached to their AVS YES    Nurse/Family Partnership- patient may qualify NO; referral placed NO    Prenatal lab work scripts YES  Extra labs ordered:  CF and SMA screening  1 hr GTT    Aspirin/Preeclampsia Screen    Risk Level Risk Factor Recommendation   LOW Prior Uncomplicated full-term delivery no No Aspirin recommendation        MODERATE Nulliparity YES Recommend low-dose aspirin if     BMI>30 no 2 or more moderate risk factors    Family History  Preeclampsia (mother/sister) no     35yr old or greater no     Black Race, Concern for SDOH/Low Socioeconomic no     IVF Pregnancy  no     Personal History Risks (low birth weight, prior adverse preg outcome, >10yr preg interval) no         HIGH History of Preeclampsia no Recommend low-dose aspirin if     Multifetal gestation no 1 or more high risk factors    Chronic HTN no     Type 1 or 2 Diabetes no     Renal Disease no     Autoimmune Disease  no      Contraindications to ASA therapy:  NSAID/ ASA allergy: no  Nasal polyps: no  Asthma with history of ASA induced bronchospasm: no  Relative contraindications:  History of GI bleed: no  Active peptic ulcer disease: no  Severe hepatic dysfunction: no    Patient should be recommended to take ASA 162mg during this pregnancy from 12-36wks to lower her risk of preeclampsia: N/A          The patient has a history now or in prior pregnancy notable for:  IUI pregnancy, PCOS, Placental subchorionic hematoma. EPDS-0      Details that I feel the provider should be aware of: This is a planned and welcomed pregnancy for parents. This pregnancy is an IUI on 10/4/24. Patient states at [place] she was told she has a small placental subchorionic hematoma that may resolve on its own. Patient states per [place] she is to refrain from intercourse until cleared at next US . [facility] referral was placed and Patient has M office number as well to have US scheduled asap. This Nurse also called MFM and spoke with Ftar who will call Patient to schedule US.1 Hr GTT was ordered for PCOS. Patient is experiencing some nausea. OTC medications and diet were discussed. Patient knows to call OB for symptoms and how to contact her nurse navigator. Patient was made aware to have lab orders completed before next appointment. Patient denies any questions at this point and verbalizes understanding.         PN1 visit scheduled. The patient was oriented to our practice, the navigator role,  reviewed delivering physicians and [facility] for Delivery. All questions were answered.    Interviewed by: Sapphire Rodriguez RN

## 2024-11-26 NOTE — PATIENT INSTRUCTIONS
Congratulations!! Please review our Pregnancy Essential Guide and [facility] L&D Virtual tour from our networks website.     [facility] Pregnancy Essentials Guide  [facility] ([email or web address])     [facility] ([employer])

## 2024-11-27 ENCOUNTER — TELEPHONE (OUTPATIENT)
Age: 29
End: 2024-11-27

## 2024-11-27 NOTE — TELEPHONE ENCOUNTER
Patient wanted to know why 12/5/24 ob intake canceled. On closer review appears appointment canceled by patient via [employer] web service.  Called [facility], spoke with Elzbieta for another appointment.  Schedule needs to be review and will call patient back with an appointment.  Message relayed to patient.

## 2024-11-27 NOTE — TELEPHONE ENCOUNTER
Patient scheduled for 12/13/24.  Concerned that she needs to be sooner due to small subchorionic hematoma .

## 2024-11-29 ENCOUNTER — TELEPHONE (OUTPATIENT)
Facility: HOSPITAL | Age: 29
End: 2024-11-29

## 2024-11-29 NOTE — TELEPHONE ENCOUNTER
"----- Message from Jaydon Tang MD sent at 11/27/2024  3:11 PM EST -----  Ruben Harrison,    We can see her within the next week if there is an opening.  Chart says she wants to see a female physician.    Thanks,    Joel  ----- Message -----  From: Juliana Reyes  Sent: 11/27/2024   1:20 PM EST  To: Jaydon Tang MD    Hi Dr. Tang! We received a referral for this PT and it stated     \"Patient with IUI 10/4/2024 from [facility]. WINSTON 6/27/2025.  Patient states Per [place] she has a small subchorionic hematoma and needs to be seen sooner than later.\"     She is scheduled for her NT on 12/13 and would like to know if she needs to be seen sooner than this, or if it is okay to evaluate the hematoma at this appt. Could you pls look over it and let me know if we should get her in sooner pls? Thanks in advance!  "

## 2024-12-02 NOTE — PATIENT COMMUNICATION
Patient called back, no openings seen for Initial OB. Advised to send a message to clerical for scheduling. Please advise.

## 2024-12-03 ENCOUNTER — TELEPHONE (OUTPATIENT)
Facility: HOSPITAL | Age: 29
End: 2024-12-03

## 2024-12-03 NOTE — TELEPHONE ENCOUNTER
Pt called back  and said  no one has called her yet  to reschedule  Initial OB  appointment and is requesting a call back

## 2024-12-05 ENCOUNTER — INITIAL PRENATAL (OUTPATIENT)
Age: 29
End: 2024-12-05

## 2024-12-05 VITALS — WEIGHT: 122.4 LBS | SYSTOLIC BLOOD PRESSURE: 110 MMHG | DIASTOLIC BLOOD PRESSURE: 62 MMHG | BODY MASS INDEX: 22.39 KG/M2

## 2024-12-05 DIAGNOSIS — O46.8X1 SUBCHORIONIC HEMATOMA IN FIRST TRIMESTER, SINGLE OR UNSPECIFIED FETUS: ICD-10-CM

## 2024-12-05 DIAGNOSIS — Z34.81 ENCOUNTER FOR SUPERVISION OF OTHER NORMAL PREGNANCY, FIRST TRIMESTER: Primary | ICD-10-CM

## 2024-12-05 DIAGNOSIS — O41.8X10 SUBCHORIONIC HEMATOMA IN FIRST TRIMESTER, SINGLE OR UNSPECIFIED FETUS: ICD-10-CM

## 2024-12-05 DIAGNOSIS — Z11.3 SCREENING FOR STD (SEXUALLY TRANSMITTED DISEASE): ICD-10-CM

## 2024-12-05 PROCEDURE — 87591 N.GONORRHOEAE DNA AMP PROB: CPT

## 2024-12-05 PROCEDURE — 87491 CHLMYD TRACH DNA AMP PROBE: CPT

## 2024-12-05 NOTE — PROGRESS NOTES
Name: Christian Ellis      : 1995      MRN: [id number]  Encounter Provider: Eva Duncan PA-C  Encounter Date: 2024   Encounter department: [facility] [place]  :  Assessment & Plan  Encounter for supervision of other normal pregnancy, first trimester    Patient should call if she experiences: vaginal bleeding, change in discharge, LOF, contractions or dysuria.  Discussed that pregnancy can increase their risk for blood clots. Discussed the warning signs: acute SOB, calf/leg pain, chest pain  Initial PNV labs ordered, not completed. Urged patient to complete these  Flu shot declined  Continue PNV  Discussed the importance of a well balanced diet and discussed foods to avoid/limit during pregnancy  Patient can continue to exercise  Patient encouraged to develop a habit of sleeping on their side. Discussed with patient that as baby gets bigger, laying on the back can cause a disruption to the blood flow for mother and baby.  Patient should avoid hot tubs or saunas during the first trimester due to the increased risk of neural tube defects  Patient feels safe at home  Seat belt use discussed  Childbirth classes/hospital facilities discussed  Patient interested in NIPS  Hydration and tylenol PRN headaches  RTO 4 weeks or sooner for prenatal visit    Screening for STD (sexually transmitted disease)    Orders:    Chlamydia/GC amplified DNA by PCR    Subchorionic hematoma in first trimester, single or unspecified fetus    Patient seeing [facility] for follow up ultrasound  Patient has been following instruction to avoid intercourse until follow up U/S  Denies VB      History of Present Illness     HPI    Christian Ellis is a 29 y.o.  here for OB visit at 10w5d weeks. TWG  4.99 kg (11 lb).  No health concerns.  Patient had an IUI on 10/4/24. 25 WINSTON confirmed at 8 week U/S with [facility].    She denies nausea/vomiting or bleeding/cramping/contractions.  She notes occasionally having headaches but they improve quickly own their own.    Prenatal labs: not completed    Scheduled with M 12/6/24 for U/S regarding MOISES    OB history: 1 IAB 2014, 1 SAB July 2024 9weeks. Current pregnancy IUI.    GYN history: Last pap smear 3/23/22 NILM. No history of STDs.     Past medical history: No    Past surgical history: No    Social history:  Denies tobacco, alcohol, or illicit drug use.    Family history:   DVT/PE: none  Cancer: none  Heart disease: none  Stroke: none    Review of Systems   Constitutional:  Negative for chills, fatigue, fever and unexpected weight change.   Eyes:  Negative for visual disturbance.   Respiratory:  Negative for shortness of breath.    Cardiovascular:  Negative for chest pain and palpitations.   Gastrointestinal:  Negative for abdominal pain, anal bleeding, blood in stool, constipation, diarrhea, nausea and vomiting.   Endocrine: Negative for cold intolerance and heat intolerance.   Genitourinary:  Negative for difficulty urinating, dysuria, frequency, hematuria, menstrual problem, pelvic pain, urgency, vaginal bleeding, vaginal discharge and vaginal pain.   Musculoskeletal:  Negative for back pain.   Skin:  Negative for rash.   Neurological:  Positive for headaches. Negative for dizziness, syncope and light-headedness.   Hematological:  Does not bruise/bleed easily.   Psychiatric/Behavioral:  Negative for dysphoric mood. The patient is not nervous/anxious.      Current Outpatient Medications on File Prior to Visit   Medication Sig Dispense Refill    Omega 3-6-9 Fatty Acids (OMEGA DHA PO) Take 2 tablets by mouth in the morning      Prenatal Vit-DSS-Fe Cbn-FA (PRENATAL AD PO) Take 3 tablets by mouth in the morning       No current facility-administered medications on file prior to visit.      Social History     Tobacco Use    Smoking status: Never    Smokeless tobacco: Never   Vaping Use    Vaping status: Never Used   Substance and Sexual Activity     Alcohol use: Never    Drug use: Never    Sexual activity: Yes     Partners: Male     Birth control/protection: None     Comment: trying to conceive        Objective   LMP  (LMP Unknown)      Physical Exam  Vitals and nursing note reviewed.   Constitutional:       General: She is not in acute distress.     Appearance: She is well-developed.   HENT:      Head: Normocephalic and atraumatic.   Eyes:      Extraocular Movements: Extraocular movements intact.   Cardiovascular:      Rate and Rhythm: Normal rate and regular rhythm.   Pulmonary:      Effort: Pulmonary effort is normal. No respiratory distress.      Breath sounds: Normal breath sounds.   Chest:   Breasts:     Right: No swelling, bleeding, inverted nipple, mass, nipple discharge, skin change or tenderness.      Left: No swelling, bleeding, inverted nipple, mass, nipple discharge, skin change or tenderness.   Abdominal:      Palpations: Abdomen is soft.      Tenderness: There is no abdominal tenderness.      Hernia: There is no hernia in the left inguinal area or right inguinal area.   Genitourinary:     General: Normal vulva.      Exam position: Lithotomy position.      Pubic Area: No rash.       Labia:         Right: No rash, tenderness or lesion.         Left: No rash, tenderness or lesion.       Urethra: No urethral pain or urethral swelling.      Vagina: No vaginal discharge, erythema, tenderness, bleeding or lesions.      Cervix: No cervical motion tenderness, discharge, friability, lesion, erythema or cervical bleeding.      Uterus: Not enlarged and not tender.       Adnexa:         Right: No mass, tenderness or fullness.          Left: No mass, tenderness or fullness.        Rectum: Normal.   Lymphadenopathy:      Upper Body:      Right upper body: No supraclavicular, axillary or pectoral adenopathy.      Left upper body: No supraclavicular, axillary or pectoral adenopathy.      Lower Body: No right inguinal adenopathy. No left inguinal adenopathy.    Skin:     General: Skin is warm and dry.   Neurological:      Mental Status: She is alert.   Psychiatric:         Mood and Affect: Mood normal.         Behavior: Behavior normal.       Fetal Heart Rate: 177    Eva Duncan PA-C

## 2024-12-05 NOTE — PROGRESS NOTES
Pt is here for pn1 visit   IUI Pregnancy   No concerns at this time  Urine neg/neg   No VB,Cramping  Pn1 Labs not completed   Last Pap 3/2022 NILM   GC/CHL Collected today   Blue Folder given/reviewed with pt at ob intake   Flu Vaccine, declined today

## 2024-12-06 ENCOUNTER — ULTRASOUND (OUTPATIENT)
Facility: HOSPITAL | Age: 29
End: 2024-12-06
Payer: COMMERCIAL

## 2024-12-06 VITALS
HEIGHT: 62 IN | SYSTOLIC BLOOD PRESSURE: 94 MMHG | DIASTOLIC BLOOD PRESSURE: 58 MMHG | HEART RATE: 76 BPM | BODY MASS INDEX: 22.56 KG/M2 | WEIGHT: 122.6 LBS

## 2024-12-06 DIAGNOSIS — O41.8X10 SUBCHORIONIC HEMATOMA IN FIRST TRIMESTER, SINGLE OR UNSPECIFIED FETUS: ICD-10-CM

## 2024-12-06 DIAGNOSIS — O46.8X1 SUBCHORIONIC HEMATOMA IN FIRST TRIMESTER, SINGLE OR UNSPECIFIED FETUS: ICD-10-CM

## 2024-12-06 DIAGNOSIS — Z3A.11 11 WEEKS GESTATION OF PREGNANCY: ICD-10-CM

## 2024-12-06 DIAGNOSIS — Z36.0 ENCOUNTER FOR ANTENATAL SCREENING FOR CHROMOSOMAL ANOMALIES: Primary | ICD-10-CM

## 2024-12-06 PROCEDURE — 99203 OFFICE O/P NEW LOW 30 MIN: CPT | Performed by: OBSTETRICS & GYNECOLOGY

## 2024-12-06 PROCEDURE — 76801 OB US < 14 WKS SINGLE FETUS: CPT | Performed by: OBSTETRICS & GYNECOLOGY

## 2024-12-06 PROCEDURE — 36415 COLL VENOUS BLD VENIPUNCTURE: CPT | Performed by: OBSTETRICS & GYNECOLOGY

## 2024-12-06 NOTE — PROGRESS NOTES
Patient chose to have [facility] [id number] Non-Invasive Prenatal Screen [id number] PLUS w/ SCA, WITH fetal sex.  Patient choose to be billed through insurance.     Patient given brochure and is aware [facility] will contact patient's insurance and coordinate coverage.  Provided [facility] contact information. General inquiries [phone], Cost estimates [phone] and [facility] Billing [phone]. Website [email or web address].     Blood collection tubes labeled with patient identifiers (name, medical record number, and date of birth).     Filled out [facility] order form. Patient chose to have blood drawn in our office at time of visit. NIPS was drawn from right arm with a butterfly needle by Christy HITCHCOCK MA.       If patient chose to have blood work drawn at a [facility] lab we requested patient notify MFM (via phone call or Yovia message) when blood collected so office can follow up on results.       Maternal Fetal Medicine will have results in approximately 5-7 business days and will call patient or notify via Yovia.  Patient aware viewing lab result online will reveal fetal sex if ordered.    Patient verbalized understanding of all instructions and no questions at this time.

## 2024-12-06 NOTE — LETTER
2024     Eva Duncan PA-C  [address] [facility] [address]    Patient: Christian Ellis   YOB: 1995   Date of Visit: 2024       Dear AFSHAN Duncan:    Thank you for referring Christian Ellis to me for evaluation. Below are my notes for this consultation.    If you have questions, please do not hesitate to call me. I look forward to following your patient along with you.         Sincerely,        Kayleen Linn MD        CC: No Recipients    Christy Reyes  2024 10:44 AM  Sign when Signing Visit  Patient chose to have [facility] QocwcteQ51 Non-Invasive Prenatal Screen [id number] PLUS w/ SCA, WITH fetal sex.  Patient choose to be billed through insurance.     Patient given brochure and is aware [facility] will contact patient's insurance and coordinate coverage.  Provided [facility] contact information. General inquiries [phone], Cost estimates [phone] and [facility] Billing [phone]. Website [email or web address].     Blood collection tubes labeled with patient identifiers (name, medical record number, and date of birth).     Filled out [facility] order form. Patient chose to have blood drawn in our office at time of visit. NIPS was drawn from right arm with a butterfly needle by Christy HITCHCOCK MA.       If patient chose to have blood work drawn at a [facility] lab we requested patient notify MFM (via phone call or [employer] message) when blood collected so office can follow up on results.       Maternal Fetal Medicine will have results in approximately 5-7 business days and will call patient or notify via [employer].  Patient aware viewing lab result online will reveal fetal sex if ordered.    Patient verbalized understanding of all instructions and no questions at this time.       Kayleen Linn MD  2024 10:52 AM  Sign when Signing Visit  [facility]: Ms. Ellis was seen today at 11w0d for dating ultrasound.  See  "ultrasound report under \"OB Procedures\" tab.      My recommendations are as follows:  We discussed that subchorionic hematoma is very small and appears to be resolving. I advised that she does not require activity restrictions at this time as long as she remains asymptomatic. She should follow-up as scheduled for nuchal translucency ultrasound next week, and was advised to schedule her detailed anatomic survey and cervical length screening around 20 weeks gestation.  We reviewed aneuploidy screening and diagnostic testing options, and she opted to proceed with NIPS, which was drawn in our office today.     Please don't hesitate to contact our office with any concerns or questions.    -Kayleen Linn MD  "

## 2024-12-06 NOTE — PROGRESS NOTES
"[facility]: Ms. Ellis was seen today at 11w0d for dating ultrasound.  See ultrasound report under \"OB Procedures\" tab.      My recommendations are as follows:  We discussed that subchorionic hematoma is very small and appears to be resolving. I advised that she does not require activity restrictions at this time as long as she remains asymptomatic. She should follow-up as scheduled for nuchal translucency ultrasound next week, and was advised to schedule her detailed anatomic survey and cervical length screening around 20 weeks gestation.  We reviewed aneuploidy screening and diagnostic testing options, and she opted to proceed with NIPS, which was drawn in our office today.     Please don't hesitate to contact our office with any concerns or questions.    -Kayleen Linn MD  "

## 2024-12-09 ENCOUNTER — RESULTS FOLLOW-UP (OUTPATIENT)
Dept: OBGYN CLINIC | Facility: CLINIC | Age: 29
End: 2024-12-09

## 2024-12-09 LAB
C TRACH DNA SPEC QL NAA+PROBE: NEGATIVE
N GONORRHOEA DNA SPEC QL NAA+PROBE: NEGATIVE
SL AMB  POCT GLUCOSE, UA: NEGATIVE
SL AMB POCT URINE PROTEIN: NEGATIVE

## 2024-12-11 ENCOUNTER — RESULTS FOLLOW-UP (OUTPATIENT)
Age: 29
End: 2024-12-11

## 2024-12-11 ENCOUNTER — APPOINTMENT (OUTPATIENT)
Dept: LAB | Facility: HOSPITAL | Age: 29
End: 2024-12-11
Payer: COMMERCIAL

## 2024-12-11 DIAGNOSIS — Z31.430 ENCOUNTER OF FEMALE FOR TESTING FOR GENETIC DISEASE CARRIER STATUS FOR PROCREATIVE MANAGEMENT: ICD-10-CM

## 2024-12-11 DIAGNOSIS — Z34.81 PRENATAL CARE, SUBSEQUENT PREGNANCY, FIRST TRIMESTER: ICD-10-CM

## 2024-12-11 DIAGNOSIS — Z36.9 ENCOUNTER FOR ANTENATAL SCREENING: ICD-10-CM

## 2024-12-11 LAB
ABO GROUP BLD: NORMAL
AMORPH URATE CRY URNS QL MICRO: ABNORMAL
BACTERIA UR QL AUTO: ABNORMAL /HPF
BASOPHILS # BLD AUTO: 0.07 THOUSANDS/ÂΜL (ref 0–0.1)
BASOPHILS NFR BLD AUTO: 1 % (ref 0–1)
BILIRUB UR QL STRIP: NEGATIVE
BLD GP AB SCN SERPL QL: NEGATIVE
CLARITY UR: ABNORMAL
COLOR UR: YELLOW
EOSINOPHIL # BLD AUTO: 0.16 THOUSAND/ÂΜL (ref 0–0.61)
EOSINOPHIL NFR BLD AUTO: 2 % (ref 0–6)
ERYTHROCYTE [DISTWIDTH] IN BLOOD BY AUTOMATED COUNT: 13.8 % (ref 11.6–15.1)
GLUCOSE UR STRIP-MCNC: NEGATIVE MG/DL
HBV SURFACE AG SER QL: NORMAL
HCT VFR BLD AUTO: 37 % (ref 34.8–46.1)
HCV AB SER QL: NORMAL
HGB BLD-MCNC: 12.4 G/DL (ref 11.5–15.4)
HGB UR QL STRIP.AUTO: NEGATIVE
HIV 1+2 AB+HIV1 P24 AG SERPL QL IA: NORMAL
HIV 2 AB SERPL QL IA: NORMAL
HIV1 AB SERPL QL IA: NORMAL
HIV1 P24 AG SERPL QL IA: NORMAL
IMM GRANULOCYTES # BLD AUTO: 0.04 THOUSAND/UL (ref 0–0.2)
IMM GRANULOCYTES NFR BLD AUTO: 1 % (ref 0–2)
KETONES UR STRIP-MCNC: NEGATIVE MG/DL
LEUKOCYTE ESTERASE UR QL STRIP: ABNORMAL
LYMPHOCYTES # BLD AUTO: 1.85 THOUSANDS/ÂΜL (ref 0.6–4.47)
LYMPHOCYTES NFR BLD AUTO: 23 % (ref 14–44)
MCH RBC QN AUTO: 29.6 PG (ref 26.8–34.3)
MCHC RBC AUTO-ENTMCNC: 33.5 G/DL (ref 31.4–37.4)
MCV RBC AUTO: 88 FL (ref 82–98)
MONOCYTES # BLD AUTO: 0.47 THOUSAND/ÂΜL (ref 0.17–1.22)
MONOCYTES NFR BLD AUTO: 6 % (ref 4–12)
NEUTROPHILS # BLD AUTO: 5.55 THOUSANDS/ÂΜL (ref 1.85–7.62)
NEUTS SEG NFR BLD AUTO: 67 % (ref 43–75)
NITRITE UR QL STRIP: NEGATIVE
NON-SQ EPI CELLS URNS QL MICRO: ABNORMAL /HPF
NRBC BLD AUTO-RTO: 0 /100 WBCS
PH UR STRIP.AUTO: 7.5 [PH]
PLATELET # BLD AUTO: 264 THOUSANDS/UL (ref 149–390)
PMV BLD AUTO: 9.8 FL (ref 8.9–12.7)
PROT UR STRIP-MCNC: NEGATIVE MG/DL
RBC # BLD AUTO: 4.19 MILLION/UL (ref 3.81–5.12)
RBC #/AREA URNS AUTO: ABNORMAL /HPF
RH BLD: POSITIVE
RUBV IGG SERPL IA-ACNC: 24.6 IU/ML
SP GR UR STRIP.AUTO: 1.01 (ref 1–1.03)
SPECIMEN EXPIRATION DATE: NORMAL
TREPONEMA PALLIDUM IGG+IGM AB [PRESENCE] IN SERUM OR PLASMA BY IMMUNOASSAY: NORMAL
UROBILINOGEN UR STRIP-ACNC: <2 MG/DL
WBC # BLD AUTO: 8.14 THOUSAND/UL (ref 4.31–10.16)
WBC #/AREA URNS AUTO: ABNORMAL /HPF

## 2024-12-11 PROCEDURE — 81001 URINALYSIS AUTO W/SCOPE: CPT

## 2024-12-11 PROCEDURE — 86850 RBC ANTIBODY SCREEN: CPT

## 2024-12-11 PROCEDURE — 87389 HIV-1 AG W/HIV-1&-2 AB AG IA: CPT

## 2024-12-11 PROCEDURE — 87340 HEPATITIS B SURFACE AG IA: CPT

## 2024-12-11 PROCEDURE — 86762 RUBELLA ANTIBODY: CPT

## 2024-12-11 PROCEDURE — 86803 HEPATITIS C AB TEST: CPT

## 2024-12-11 PROCEDURE — 85025 COMPLETE CBC W/AUTO DIFF WBC: CPT

## 2024-12-11 PROCEDURE — 86901 BLOOD TYPING SEROLOGIC RH(D): CPT

## 2024-12-11 PROCEDURE — 86780 TREPONEMA PALLIDUM: CPT

## 2024-12-11 PROCEDURE — 87086 URINE CULTURE/COLONY COUNT: CPT

## 2024-12-11 PROCEDURE — 36415 COLL VENOUS BLD VENIPUNCTURE: CPT

## 2024-12-11 PROCEDURE — 86900 BLOOD TYPING SEROLOGIC ABO: CPT

## 2024-12-12 ENCOUNTER — RESULTS FOLLOW-UP (OUTPATIENT)
Facility: HOSPITAL | Age: 29
End: 2024-12-12

## 2024-12-12 ENCOUNTER — TELEPHONE (OUTPATIENT)
Age: 29
End: 2024-12-12

## 2024-12-12 LAB
BACTERIA UR CULT: NORMAL
CFDNA.FET/CFDNA.TOTAL SFR FETUS: NORMAL %
CITATION REF LAB TEST: NORMAL
FET 13+18+21+X+Y ANEUP PLAS.CFDNA: NEGATIVE
FET CHR 21 TS PLAS.CFDNA QL: NEGATIVE
FET CHR 21 TS PLAS.CFDNA QL: NEGATIVE
FET MS X RISK WBC.DNA+CFDNA QL: NOT DETECTED
FET SEX PLAS.CFDNA DOSAGE CFDNA: NORMAL
FET TS 13 RISK PLAS.CFDNA QL: NEGATIVE
FET X + Y ANEUP RISK PLAS.CFDNA SEQ-IMP: NOT DETECTED
GA EST FROM CONCEPTION DATE: NORMAL D
GESTATIONAL AGE > 9:: YES
LAB DIRECTOR NAME PROVIDER: NORMAL
LAB DIRECTOR NAME PROVIDER: NORMAL
LABORATORY COMMENT REPORT: NORMAL
LIMITATIONS OF THE TEST: NORMAL
NEGATIVE PREDICTIVE VALUE: NORMAL
PERFORMANCE CHARACTERISTICS: NORMAL
POSITIVE PREDICTIVE VALUE: NORMAL
REF LAB TEST METHOD: NORMAL
SL AMB NOTE:: NORMAL
TEST PERFORMANCE INFO SPEC: NORMAL

## 2024-12-12 NOTE — RESULT ENCOUNTER NOTE
Dear AFSHAN Duncan,  I've reviewed this NIPS result which is low-risk. Patient was notified via [employer]. MSAFP (spina bifida screening) is recommended to be completed between 15-20 weeks gestation (and prior fetal anatomic survey). You are scheduled to see her for her next OB visit, can you please order and review recommendation? Thank you.   Kayleen Linn MD Patient

## 2024-12-13 ENCOUNTER — ROUTINE PRENATAL (OUTPATIENT)
Dept: PERINATAL CARE | Facility: OTHER | Age: 29
End: 2024-12-13
Payer: COMMERCIAL

## 2024-12-13 VITALS
DIASTOLIC BLOOD PRESSURE: 60 MMHG | SYSTOLIC BLOOD PRESSURE: 100 MMHG | BODY MASS INDEX: 22.82 KG/M2 | HEART RATE: 77 BPM | HEIGHT: 62 IN | WEIGHT: 124 LBS

## 2024-12-13 DIAGNOSIS — Z36.0 ENCOUNTER FOR ANTENATAL SCREENING FOR CHROMOSOMAL ANOMALIES: ICD-10-CM

## 2024-12-13 DIAGNOSIS — Z36.82 NUCHAL TRANSLUCENCY OF FETUS ON PRENATAL ULTRASOUND: ICD-10-CM

## 2024-12-13 DIAGNOSIS — Z3A.12 12 WEEKS GESTATION OF PREGNANCY: Primary | ICD-10-CM

## 2024-12-13 DIAGNOSIS — Z34.81 PRENATAL CARE, SUBSEQUENT PREGNANCY, FIRST TRIMESTER: ICD-10-CM

## 2024-12-13 PROBLEM — Z59.71 DOES NOT HAVE HEALTH INSURANCE: Status: ACTIVE | Noted: 2024-12-13

## 2024-12-13 PROCEDURE — 99213 OFFICE O/P EST LOW 20 MIN: CPT | Performed by: OBSTETRICS & GYNECOLOGY

## 2024-12-13 PROCEDURE — 76813 OB US NUCHAL MEAS 1 GEST: CPT | Performed by: OBSTETRICS & GYNECOLOGY

## 2024-12-13 PROCEDURE — 76801 OB US < 14 WKS SINGLE FETUS: CPT | Performed by: OBSTETRICS & GYNECOLOGY

## 2024-12-13 NOTE — PROGRESS NOTES
A fetal ultrasound was completed. See Ob procedures in Epic for an interpretation and recommendations. Do not hesitate to contact us in [facility] if you have questions.    Eliot Romero MD, MSCE  Maternal Fetal Medicine

## 2024-12-13 NOTE — LETTER
December 13, 2024     Donna Clifford PA-C  [address]  [place] [address]    Patient: Christian Ellis   YOB: 1995   Date of Visit: 12/13/2024       Dear Ms. Clifford:    Thank you for referring Christian Ellis to me for evaluation. Below are my notes for this consultation.    If you have questions, please do not hesitate to call me. I look forward to following your patient along with you.         Sincerely,        Eliot Romero MD        CC: No Recipients    Eliot Romero MD  12/13/2024 11:12 AM  Sign when Signing Visit  A fetal ultrasound was completed. See Ob procedures in Epic for an interpretation and recommendations. Do not hesitate to contact us in [facility] if you have questions.    Eliot Romero MD, MSCE  Maternal Fetal Medicine

## 2024-12-17 LAB
CITATION REF LAB TEST: NORMAL
CLINICAL INFO: NORMAL
ETHNIC BACKGROUND STATED: NORMAL
GENE DIS ANL CARRIER INTERP-IMP: NORMAL
GENE MUT TESTED BLD/T: NORMAL
LAB DIRECTOR NAME PROVIDER: NORMAL
REASON FOR REFERRAL (NARRATIVE): NORMAL
RECOMMENDATION PATIENT DOC-IMP: NORMAL
REF LAB TEST METHOD: NORMAL
SERVICE CMNT-IMP: NORMAL
SMN1 GENE MUT ANL BLD/T: NORMAL
SPECIMEN SOURCE: NORMAL

## 2025-01-02 ENCOUNTER — ROUTINE PRENATAL (OUTPATIENT)
Age: 30
End: 2025-01-02
Payer: COMMERCIAL

## 2025-01-02 VITALS
DIASTOLIC BLOOD PRESSURE: 60 MMHG | HEIGHT: 62 IN | SYSTOLIC BLOOD PRESSURE: 102 MMHG | WEIGHT: 126.6 LBS | BODY MASS INDEX: 23.3 KG/M2 | HEART RATE: 98 BPM

## 2025-01-02 DIAGNOSIS — Z3A.14 14 WEEKS GESTATION OF PREGNANCY: ICD-10-CM

## 2025-01-02 DIAGNOSIS — Z36.9 ANTENATAL SCREENING ENCOUNTER: ICD-10-CM

## 2025-01-02 DIAGNOSIS — Z34.82 ENCOUNTER FOR SUPERVISION OF OTHER NORMAL PREGNANCY, SECOND TRIMESTER: Primary | ICD-10-CM

## 2025-01-02 PROBLEM — Z3A.15 15 WEEKS GESTATION OF PREGNANCY: Status: ACTIVE | Noted: 2024-12-06

## 2025-01-02 LAB
SL AMB  POCT GLUCOSE, UA: NORMAL
SL AMB POCT URINE PROTEIN: NORMAL

## 2025-01-02 PROCEDURE — 81002 URINALYSIS NONAUTO W/O SCOPE: CPT

## 2025-01-02 PROCEDURE — PNV

## 2025-01-02 NOTE — PROGRESS NOTES
Name: Christian Ellis      : 1995      MRN: [id number]  Encounter Provider: Eva Duncan PA-C  Encounter Date: 2025   Encounter department: [facility] [place]  :  Assessment & Plan  Encounter for supervision of other normal pregnancy, second trimester    Orders:    POCT urine dip     screening encounter  AFP ordered and discussed  Continue daily PNV  Influenza vaccine declined  MFM appt 24   RTO in 4 weeks.  Orders:    Alpha fetoprotein, maternal; Future    14 weeks gestation of pregnancy  Blood Type:A+. Antibody   Pap -  NILM  GC/CT -  negative  PN1 Labs- all completed but 1 hr GTT, discussed  28 Week Labs-     Flu vaccine - decline  Blue folder- has  Yellow folder-     NIPS-  low risk  AFP- ordered    Level 2- 24    Patient should call if she experiences: vaginal bleeding, change in discharge, LOF, contractions or dysuria.  Discussed that pregnancy can increase their risk for blood clots. Discussed the warning signs: acute SOB, calf/leg pain, chest pain  Labs currently available were reviewed  Continue prenatal vitamin  Discussed the importance of a well balanced diet and discussed foods to avoid/limit during pregnancy          History of Present Illness   HPI  Christian Ellis is a 29 y.o.  here for OB visit at 14w6d weeks. TWG  5.715 kg (12 lb 9.6 oz).  No health concerns.     Denies LOF, vaginal bleeding or contractions.    Current Outpatient Medications on File Prior to Visit   Medication Sig Dispense Refill    Omega 3-6-9 Fatty Acids (OMEGA DHA PO) Take 2 tablets by mouth in the morning      Prenatal Vit-DSS-Fe Cbn-FA (PRENATAL AD PO) Take 3 tablets by mouth in the morning       No current facility-administered medications on file prior to visit.      Social History     Tobacco Use    Smoking status: Never    Smokeless tobacco: Never   Vaping Use    Vaping status: Never Used   Substance and Sexual Activity    Alcohol use:  "Never    Drug use: Never    Sexual activity: Yes     Partners: Male     Birth control/protection: None     Comment: trying to conceive        Objective   /60 (BP Location: Left arm, Patient Position: Sitting, Cuff Size: Adult)   Pulse 98   Ht 5' 2\" (1.575 m)   Wt 57.4 kg (126 lb 9.6 oz)   LMP  (LMP Unknown)   BMI 23.16 kg/m²      Fetal Heart Rate: 150    Eva Duncan PA-C  "

## 2025-01-02 NOTE — PROGRESS NOTES
Pt is here for routine ob visit   No concerns at this time  No VB,Cramping  Declined Flu vaccine.  AFP ordered today.

## 2025-01-02 NOTE — ASSESSMENT & PLAN NOTE
Blood Type:A+. Antibody   Pap - 2022 NILM  GC/CT -  negative  PN1 Labs- all completed but 1 hr GTT, discussed  28 Week Labs-     Flu vaccine - decline  Blue folder- has  Yellow folder-     NIPS-  low risk  AFP- ordered    Level 2- 2/7/24    Patient should call if she experiences: vaginal bleeding, change in discharge, LOF, contractions or dysuria.  Discussed that pregnancy can increase their risk for blood clots. Discussed the warning signs: acute SOB, calf/leg pain, chest pain  Labs currently available were reviewed  Continue prenatal vitamin  Discussed the importance of a well balanced diet and discussed foods to avoid/limit during pregnancy

## 2025-01-09 LAB
CFTR FULL MUT ANL BLD/T SEQ: NORMAL
CITATION REF LAB TEST: NORMAL
CLINICAL INFO: NORMAL
ETHNIC BACKGROUND STATED: NORMAL
GENE DIS ANL CARRIER INTERP-IMP: NORMAL
INDICATION: NORMAL
LAB DIRECTOR NAME PROVIDER: NORMAL
RECOMMENDATION PATIENT DOC-IMP: NORMAL
REF LAB TEST METHOD: NORMAL
SERVICE CMNT-IMP: NORMAL
SPECIMEN SOURCE: NORMAL

## 2025-01-23 ENCOUNTER — APPOINTMENT (OUTPATIENT)
Dept: LAB | Facility: HOSPITAL | Age: 30
End: 2025-01-23
Payer: COMMERCIAL

## 2025-01-23 DIAGNOSIS — E28.2 PCOS (POLYCYSTIC OVARIAN SYNDROME): ICD-10-CM

## 2025-01-23 DIAGNOSIS — Z36.9 ANTENATAL SCREENING ENCOUNTER: ICD-10-CM

## 2025-01-23 LAB — GLUCOSE 1H P 50 G GLC PO SERPL-MCNC: 109 MG/DL (ref 70–134)

## 2025-01-23 PROCEDURE — 36415 COLL VENOUS BLD VENIPUNCTURE: CPT

## 2025-01-23 PROCEDURE — 82950 GLUCOSE TEST: CPT

## 2025-01-23 PROCEDURE — 82105 ALPHA-FETOPROTEIN SERUM: CPT

## 2025-01-25 LAB
2ND TRIMESTER 4 SCREEN SERPL-IMP: NORMAL
AFP ADJ MOM SERPL: 1.4
AFP INTERP AMN-IMP: NORMAL
AFP INTERP SERPL-IMP: NORMAL
AFP INTERP SERPL-IMP: NORMAL
AFP SERPL-MCNC: 66.5 NG/ML
AGE AT DELIVERY: 31.1 YR
GA METHOD: NORMAL
GA: 17.9 WEEKS
IDDM PATIENT QL: NO
MULTIPLE PREGNANCY: NO
NEURAL TUBE DEFECT RISK FETUS: 3600 %

## 2025-01-27 ENCOUNTER — RESULTS FOLLOW-UP (OUTPATIENT)
Dept: OBGYN CLINIC | Facility: CLINIC | Age: 30
End: 2025-01-27

## 2025-01-29 ENCOUNTER — ROUTINE PRENATAL (OUTPATIENT)
Age: 30
End: 2025-01-29
Payer: COMMERCIAL

## 2025-01-29 VITALS
BODY MASS INDEX: 24.29 KG/M2 | HEART RATE: 72 BPM | DIASTOLIC BLOOD PRESSURE: 62 MMHG | WEIGHT: 132 LBS | HEIGHT: 62 IN | SYSTOLIC BLOOD PRESSURE: 90 MMHG

## 2025-01-29 DIAGNOSIS — Z3A.18 18 WEEKS GESTATION OF PREGNANCY: Primary | ICD-10-CM

## 2025-01-29 DIAGNOSIS — Z34.82 PRENATAL CARE, SUBSEQUENT PREGNANCY, SECOND TRIMESTER: ICD-10-CM

## 2025-01-29 LAB
SL AMB  POCT GLUCOSE, UA: NORMAL
SL AMB POCT URINE PROTEIN: NORMAL

## 2025-01-29 PROCEDURE — 81002 URINALYSIS NONAUTO W/O SCOPE: CPT | Performed by: OBSTETRICS & GYNECOLOGY

## 2025-01-29 PROCEDURE — PNV: Performed by: OBSTETRICS & GYNECOLOGY

## 2025-01-29 NOTE — ASSESSMENT & PLAN NOTE
Pt doing well today. No complaints  No FM yet. Denies ctx, vb and lof.   AFP negative   Has anatomy u/s scheduled for 2/7  Precautions reviewed. RTO in 4 weeks.

## 2025-01-29 NOTE — PROGRESS NOTES
"Problem List Items Addressed This Visit       18 weeks gestation of pregnancy - Primary    Pt doing well today. No complaints  No FM yet. Denies ctx, vb and lof.   AFP negative   Has anatomy u/s scheduled for   Precautions reviewed. RTO in 4 weeks.           Other Visit Diagnoses         Prenatal care, subsequent pregnancy, second trimester        Relevant Orders    POCT urine dip (Completed)            Christian Ellis is a 29 y.o.  at 18w5d who presents today for routine prenatal visit.     BP 90/62 (BP Location: Left arm, Patient Position: Sitting, Cuff Size: Adult)   Pulse 72   Ht 5' 2\" (1.575 m)   Wt 59.9 kg (132 lb)   LMP  (LMP Unknown)   BMI 24.14 kg/m²         "

## 2025-01-29 NOTE — PROGRESS NOTES
Patient presents for a routine prenatal visit    18W5D  No LOF,bleeding,discharge or cramping.  No current complaints at this time.  AFP completeted: neg  Anatomy u/s: scheduled for   Declined flu vaccine

## 2025-01-31 PROBLEM — Z87.42 HISTORY OF PCOS: Status: ACTIVE | Noted: 2025-01-31

## 2025-01-31 PROBLEM — Z3A.20 20 WEEKS GESTATION OF PREGNANCY: Status: ACTIVE | Noted: 2025-01-31

## 2025-02-06 PROBLEM — O09.819 PREGNANCY RESULTING FROM ASSISTED REPRODUCTIVE TECHNOLOGY: Status: ACTIVE | Noted: 2025-02-06

## 2025-02-07 ENCOUNTER — ROUTINE PRENATAL (OUTPATIENT)
Dept: PERINATAL CARE | Facility: OTHER | Age: 30
End: 2025-02-07
Payer: COMMERCIAL

## 2025-02-07 VITALS
HEART RATE: 99 BPM | BODY MASS INDEX: 24.66 KG/M2 | DIASTOLIC BLOOD PRESSURE: 62 MMHG | HEIGHT: 62 IN | SYSTOLIC BLOOD PRESSURE: 100 MMHG | WEIGHT: 134 LBS

## 2025-02-07 DIAGNOSIS — Z87.42 HISTORY OF PCOS: ICD-10-CM

## 2025-02-07 DIAGNOSIS — Z36.89 ENCOUNTER FOR FETAL ANATOMIC SURVEY: ICD-10-CM

## 2025-02-07 DIAGNOSIS — Z03.72 SUSPECTED PROBLEM WITH PLACENTA NOT FOUND: Primary | ICD-10-CM

## 2025-02-07 DIAGNOSIS — O09.812 PREGNANCY RESULTING FROM ASSISTED REPRODUCTIVE TECHNOLOGY IN SECOND TRIMESTER: ICD-10-CM

## 2025-02-07 DIAGNOSIS — Z3A.20 20 WEEKS GESTATION OF PREGNANCY: ICD-10-CM

## 2025-02-07 DIAGNOSIS — O46.8X1 SUBCHORIONIC HEMATOMA IN FIRST TRIMESTER, SINGLE OR UNSPECIFIED FETUS: ICD-10-CM

## 2025-02-07 DIAGNOSIS — Z36.86 ENCOUNTER FOR ANTENATAL SCREENING FOR CERVICAL LENGTH: ICD-10-CM

## 2025-02-07 DIAGNOSIS — O41.8X10 SUBCHORIONIC HEMATOMA IN FIRST TRIMESTER, SINGLE OR UNSPECIFIED FETUS: ICD-10-CM

## 2025-02-07 PROCEDURE — 76811 OB US DETAILED SNGL FETUS: CPT | Performed by: OBSTETRICS & GYNECOLOGY

## 2025-02-07 PROCEDURE — 76817 TRANSVAGINAL US OBSTETRIC: CPT | Performed by: OBSTETRICS & GYNECOLOGY

## 2025-02-07 PROCEDURE — 99213 OFFICE O/P EST LOW 20 MIN: CPT

## 2025-02-07 NOTE — PROGRESS NOTES
Christian Ellis has no complaints today at 20w0d. She is overall feeling well and does not report any vaginal bleeding or signs of labor. Her recently completed fetal testing revealed a low risk NIPT genetic screening and negative MSAFP screen (MoM 1.40). She is here today for an ultrasound for fetal anatomy/cervical length screening.    Problem list:  1. Pregnancy resulting from assisted reproductive technology- IUI  2.  Prior subchorionic hematoma    Ultrasound findings:  The ultrasound today shows normal interval fetal growth and fluid, normal cervical length, and no malformations were detected.  The subchorionic hematoma has resolved.    Pregnancy ultrasound has limitations and is unable to detect all forms of fetal congenital abnormalities.      Follow up recommended:   1. No further follow-up are recommended at [facility] unless otherwise clinically indicated.    Split-shared decision-making between MARIO Conde and myself was utilized, with the majority of the time spent by JOSE Conde.  Medical decision-making for this encounter was low (diagnosis low, data low and risk low). I reviewed the ultrasound pictures and recommended the medical decision making transcribed in the care of this patient.    I reviewed the ultrasound pictures and recommended the medical decision making transcribed in the care of this patient.  Elva Dempsey M.D.

## 2025-02-07 NOTE — LETTER
February 7, 2025     MARIO Cat  [address] [facility] [address]    Patient: Christian Ellis   YOB: 1995   Date of Visit: 2/7/2025       Dear Dr. Gonzalez:    Thank you for referring Christian Ellis to me for evaluation. Below are my notes for this consultation.    If you have questions, please do not hesitate to call me. I look forward to following your patient along with you.         Sincerely,        Elva Dempsey MD        CC: No Recipients    Elva Dempsey MD  2/7/2025  1:06 PM  Sign when Signing Visit  Christian Ellis has no complaints today at 20w0d. She is overall feeling well and does not report any vaginal bleeding or signs of labor. Her recently completed fetal testing revealed a low risk NIPT genetic screening and negative MSAFP screen (MoM 1.40). She is here today for an ultrasound for fetal anatomy/cervical length screening.    Problem list:  1. Pregnancy resulting from assisted reproductive technology- IUI  2.  Prior subchorionic hematoma    Ultrasound findings:  The ultrasound today shows normal interval fetal growth and fluid, normal cervical length, and no malformations were detected.  The subchorionic hematoma has resolved.    Pregnancy ultrasound has limitations and is unable to detect all forms of fetal congenital abnormalities.      Follow up recommended:   1. No further follow-up are recommended at [facility] unless otherwise clinically indicated.    Split-shared decision-making between MARIO Conde and myself was utilized, with the majority of the time spent by JOSE Conde.  Medical decision-making for this encounter was low (diagnosis low, data low and risk low). I reviewed the ultrasound pictures and recommended the medical decision making transcribed in the care of this patient.    I reviewed the ultrasound pictures and recommended the medical decision making transcribed in the care of this patient.  Elva Dempsey M.D.

## 2025-02-25 PROBLEM — Z3A.22 22 WEEKS GESTATION OF PREGNANCY: Status: ACTIVE | Noted: 2025-01-31

## 2025-02-25 NOTE — ASSESSMENT & PLAN NOTE
Declined flu shot  Early 1*GTT nml (done for PCOS hx)  Pap neg 2022  GC/CT neg   AFP neg  matT21 low risk  L2 on 2/7 wnl, prn follow up

## 2025-02-25 NOTE — PROGRESS NOTES
Name: Christian Ellis      : 1995      MRN: [id number]  Encounter Provider: MARIO Cat  Encounter Date: 2025   Encounter department: [facility] [place]  :  Assessment & Plan  Prenatal care, subsequent pregnancy, second trimester  Feels well. Denies LOF/CTX/VB. Discussed fetal awareness. No concerns. Overview charting updated today.         22 weeks gestation of pregnancy  Declined flu shot  Early 1*GTT nml (done for PCOS hx)  Pap neg   GC/CT neg   AFP neg  matT21 low risk  L2 on  wnl, prn follow up       Pregnancy resulting from assisted reproductive technology in second trimester

## 2025-02-25 NOTE — PATIENT INSTRUCTIONS
Patient Education     Pregnancy - The Fifth Month   About this topic   It is important for you to learn how to take care of yourself to help you have a healthy baby and safe delivery. It is good to have health care throughout your pregnancy.  The fifth month of your pregnancy starts around week 19 and lasts through week 23. By knowing how far along you are, you can learn what is normal for this stage of your pregnancy and plan for what is next.  General   Growth and Development   During the fifth month of your pregnancy, here are some things you can expect.  You may:  Start to gain a little more weight. It is normal to gain about 10 to 15 pounds (4.5 to 7 kg) total in your first 5 months.  Have leg cramps. Be sure to drink plenty of water.  Have loose teeth.  Have more trouble breathing or with heartburn as your baby gets bigger  Start having [place] contractions. You may feel your belly squeezing or getting tight. Be sure to drink 6 to 8 glasses of water each day.  Notice you are able to express milk from your breasts  See stretch marks on your belly, breasts, or legs. Stay active to try and keep good muscle tone.  Be checked for gestational diabetes  Your baby's growth and development:  Your baby is covered with a thick whitish substance called vernix. This helps protect your baby’s skin.  Their genitals are able to be seen on an ultrasound. Your doctor will check your baby’s size, how much fluid there is around your baby, and all of your baby’s organs with the ultrasound.  Your baby is starting to be able to see, hear, taste, and feel touch.  The bones are starting to make blood cells.  Your baby is about 11 inches (28 cm) long and weighs about 1 pound (450 gm). Your baby is about the size of a grapefruit.  Things to Think About   Avoid alcohol, drugs, tobacco products, and second hand smoke  Do not clean your cat litter box. You could get a disease that causes birth defects to your baby.  Check with your  doctor before taking any kind of drugs. Continue to take your vitamin with folic acid.  Do you plan to breastfeed your baby?  Where will you deliver? Do you plan to take prenatal classes? If so, try to have them completed by your 8th month.  Start to think about your plans for pain control during labor.  You may want to learn about cord blood banking.  Rest and take breaks each day.  When do I need to call the doctor?   Contractions every 10 minutes or more often that do not go away with drinking water or position changes  Low, dull back pain that does not go away  Pressure in your pelvis that feels like your baby is pushing down  A gush or constant trickle of watery or bloody fluid leaking from your vagina  Cramps in your lower belly that come and go or are constant  Little to no movement felt by baby in 2 hours. Your baby should move at least 10 times every 2 hours.  Headache that does not go away, blurry vision, seeing spots or halos, increase in swelling in your hands, feet, or face, and pain under your ribs on the right side  Fever of 100.4°F (38°C) or higher  Bleeding or swelling of your gums  Urinating less, or having pain when you urinate  Vaginal bleeding with or without pain  After a car accident, fall, or any trauma to your belly  Having thoughts of harming yourself or others, or do not feel safe at home  Last Reviewed Date   2020-04-20  Consumer Information Use and Disclaimer   This generalized information is a limited summary of diagnosis, treatment, and/or medication information. It is not meant to be comprehensive and should be used as a tool to help the user understand and/or assess potential diagnostic and treatment options. It does NOT include all information about conditions, treatments, medications, side effects, or risks that may apply to a specific patient. It is not intended to be medical advice or a substitute for the medical advice, diagnosis, or treatment of a health care provider based on  the health care provider's examination and assessment of a patient’s specific and unique circumstances. Patients must speak with a health care provider for complete information about their health, medical questions, and treatment options, including any risks or benefits regarding use of medications. This information does not endorse any treatments or medications as safe, effective, or approved for treating a specific patient. UpToDate, Inc. and its affiliates disclaim any warranty or liability relating to this information or the use thereof. The use of this information is governed by the Terms of Use, available at https://www.woltersMC2uwer.com/en/know/clinical-effectiveness-terms   Copyright   Copyright © 2024 UpToDate, Inc. and its affiliates and/or licensors. All rights reserved.

## 2025-02-27 ENCOUNTER — ROUTINE PRENATAL (OUTPATIENT)
Age: 30
End: 2025-02-27
Payer: COMMERCIAL

## 2025-02-27 VITALS
DIASTOLIC BLOOD PRESSURE: 82 MMHG | BODY MASS INDEX: 25.47 KG/M2 | HEIGHT: 62 IN | WEIGHT: 138.4 LBS | SYSTOLIC BLOOD PRESSURE: 104 MMHG

## 2025-02-27 DIAGNOSIS — Z34.82 PRENATAL CARE, SUBSEQUENT PREGNANCY, SECOND TRIMESTER: Primary | ICD-10-CM

## 2025-02-27 DIAGNOSIS — Z3A.22 22 WEEKS GESTATION OF PREGNANCY: ICD-10-CM

## 2025-02-27 DIAGNOSIS — O09.812 PREGNANCY RESULTING FROM ASSISTED REPRODUCTIVE TECHNOLOGY IN SECOND TRIMESTER: ICD-10-CM

## 2025-02-27 PROBLEM — Z59.71 DOES NOT HAVE HEALTH INSURANCE: Status: RESOLVED | Noted: 2024-12-13 | Resolved: 2025-02-27

## 2025-02-27 LAB
SL AMB  POCT GLUCOSE, UA: NORMAL
SL AMB POCT URINE PROTEIN: NORMAL

## 2025-02-27 PROCEDURE — 81002 URINALYSIS NONAUTO W/O SCOPE: CPT | Performed by: NURSE PRACTITIONER

## 2025-02-27 PROCEDURE — PNV: Performed by: NURSE PRACTITIONER

## 2025-03-10 ENCOUNTER — NURSE TRIAGE (OUTPATIENT)
Dept: OTHER | Facility: OTHER | Age: 30
End: 2025-03-10

## 2025-03-10 NOTE — TELEPHONE ENCOUNTER
"FOLLOW UP: Messaged on call provider per patient's request but did advise patient of protocol to be seen due to due to decreased fetal movement. Provider stated patient should be seen if not having at least 10 movements in 2 hours. Reviewed recommendation with patient and she stated she will wait until 2:30 am to see how much movement she has. She has had 3 movements since originally calling at 0108 but only 4 movements in the last two hours so far. She will callback to inform of her decision.     REASON FOR CONVERSATION: Pregnancy Problem    SYMPTOMS: Decreased fetal movement    OTHER: Patient is 24w3d and called in with decreased fetal movement since that started Sunday evening. Patient stated that the baby is normally really active overnight but only had one to two kicks in the last two hours and then drank apple juice, repositioned, and pushed on belly about 45 minute ago. patient had two movements in 15 minute triage. Patient also checked heart rate with home doppler about 1 hour ago and had a heart rate of 150. Advised patient that if having decreased movement <5 an hour or <10 in 2 hours protocol is evaluation in labor. Upon calling back to review recommendations, patient had one more movement.    DISPOSITION: Home Care, Go to [facility] (or PCP Triage)      Reason for Disposition   [1] Pregnant 23 or more weeks AND [2] baby is moving less today by kick count (e.g., kick count < 5 in 1 hour or < 10 in 2 hours)   [1] Pregnant 23 or more weeks AND [2] baby moving normally OR normal kick count    Answer Assessment - Initial Assessment Questions  1. FETAL MOVEMENT: \"Has the baby's movement decreased or changed significantly from normal?\" (e.g., yes, no; describe) \"When was the last time you felt the baby move?\" (e.g., minutes, hours)        Last felt baby move about an hour ago. Sunday into Monday not moving as much.    2. WINSTON: \"What date are you expecting to deliver?\"         06/27/2025    3. PREGNANCY: \"How many " "weeks pregnant are you?\" \"How has the pregnancy been going?\"        24w3d    4. OTHER SYMPTOMS: \"Do you have any other symptoms?\" (e.g., abdomen pain, fever, leaking fluid from vagina, vaginal bleeding, widespread itching, etc.)        Denies    Protocols used: Pregnancy - Decreased or Abnormal Fetal Movement-Adult-AH    "

## 2025-03-26 NOTE — PROGRESS NOTES
Prenatal Visit  Prenatal Visit  Name: Christian Ellis      : 1995      MRN: [id number]  Encounter Provider: Eva Duncan PA-C  Encounter Date: 3/27/2025   Encounter department: [facility]    :  Assessment & Plan  22 weeks gestation of pregnancy    Blood Type: A+   Pap -  NILM  GC/CT -  negative  PN1 Labs-  early 1 hr GTT all other prenatal labs WNL  28 Week Labs- ordered     Flu vaccine - decline  Blue folder- has  Yellow folder-     NIPS-  low risk  AFP- ordered     Level 2- 24 WNL     TDAP - declined     Delivery consent-  Breast pump -   Pediatrician -  L&D forms-     Perineal massage -  GBS swab -   IOL -    Pregnancy resulting from assisted reproductive technology in second trimester    Date of IUI 10/4/24  oel 25 confirmed by [place] at 8 week U/S    Prenatal care, subsequent pregnancy, second trimester  26w6d  Doing well  Level 2 US WNL  Reviewed adan banegas ctx and s/s PTL.   Precautions reviewed to call for - Vaginal bldg, LOF, abnormal d/c or > 4 ctx in an hour.   FM not consistent until around 28 wks.     Questions have been answered to her satisfaction.    RTO in 2 weeks or sooner if needed  Orders:    POCT urine dip      Subjective:   History of Present Illness   History of Present Illness     Christian is a 29 y.o.  26w6d here for a routine PNV    She denies any cramping, LOF/unusual discharge or VB.  She has noted fetal movement.     I have reviewed the patient's Level II u/s which was normal       Objective:  Objective   /62 (BP Location: Left arm, Patient Position: Sitting, Cuff Size: Standard)   Wt 67.4 kg (148 lb 9.6 oz)   LMP  (LMP Unknown)   BMI 27.18 kg/m²     Pregravid Weight/BMI: 51.7 kg (114 lb) (BMI 20.85)  Current Weight: 67.4 kg (148 lb 9.6 oz)   Total Weight Gain: 15.7 kg (34 lb 9.6 oz)     Fetal Heart Rate: 145 Fundal Height (cm): 27 cm      Eva Duncan PA-C  3/27/2025

## 2025-03-27 ENCOUNTER — ROUTINE PRENATAL (OUTPATIENT)
Age: 30
End: 2025-03-27
Payer: COMMERCIAL

## 2025-03-27 VITALS — SYSTOLIC BLOOD PRESSURE: 110 MMHG | BODY MASS INDEX: 27.18 KG/M2 | DIASTOLIC BLOOD PRESSURE: 62 MMHG | WEIGHT: 148.6 LBS

## 2025-03-27 DIAGNOSIS — Z3A.22 22 WEEKS GESTATION OF PREGNANCY: Primary | ICD-10-CM

## 2025-03-27 DIAGNOSIS — Z34.82 PRENATAL CARE, SUBSEQUENT PREGNANCY, SECOND TRIMESTER: ICD-10-CM

## 2025-03-27 DIAGNOSIS — O09.812 PREGNANCY RESULTING FROM ASSISTED REPRODUCTIVE TECHNOLOGY IN SECOND TRIMESTER: ICD-10-CM

## 2025-03-27 LAB
SL AMB  POCT GLUCOSE, UA: NEGATIVE
SL AMB POCT URINE PROTEIN: ABNORMAL

## 2025-03-27 PROCEDURE — 81002 URINALYSIS NONAUTO W/O SCOPE: CPT

## 2025-03-27 PROCEDURE — PNV

## 2025-03-27 NOTE — ASSESSMENT & PLAN NOTE
Blood Type: A+   Pap - 2022 NILM  GC/CT -  negative  PN1 Labs-  early 1 hr GTT all other prenatal labs WNL  28 Week Labs- ordered     Flu vaccine - decline  Blue folder- has  Yellow folder-     NIPS-  low risk  AFP- ordered     Level 2- 2/7/24 WNL     TDAP - declined     Delivery consent-  Breast pump -   Pediatrician -  L&D forms-     Perineal massage -  GBS swab -   IOL -

## 2025-03-28 ENCOUNTER — RESULTS FOLLOW-UP (OUTPATIENT)
Age: 30
End: 2025-03-28

## 2025-03-29 ENCOUNTER — NURSE TRIAGE (OUTPATIENT)
Dept: OTHER | Facility: OTHER | Age: 30
End: 2025-03-29

## 2025-03-29 NOTE — TELEPHONE ENCOUNTER
"FOLLOW UP: Please follow up with patient regarding urine dip results    REASON FOR CONVERSATION: Labs Only    SYMPTOMS: none    OTHER: None      DISPOSITION: Call PCP When Office is Open    Reason for Disposition   Nursing judgment or information in reference    Answer Assessment - Initial Assessment Questions  1. REASON FOR CALL: \"What is your main concern right now?\"    Patient calling for results of urine dip; denies any symptoms: no headache, chest pain shortness of breath, dizziness or blurred vision. Denies contractions, vaginal bleeding, or leakage of fluid. Baby moving ok.    Protocols used: No Guideline Available-Adult-    "

## 2025-03-29 NOTE — TELEPHONE ENCOUNTER
"Regardin Weeks Pregnant, Urine Test Abnormal  ----- Message from Araseli MISHRA sent at 3/29/2025  3:33 PM EDT -----  \" I am 27 weeks Pregnant and My urine Test showed Abnormal. \"    "

## 2025-03-31 NOTE — TELEPHONE ENCOUNTER
Patient's urine on 03/27/2025 had trace of protein glucose negative. Advised that this was normal on mychart messaging. Please refer to results follow up from today (03/31/2025)

## 2025-04-17 ENCOUNTER — APPOINTMENT (OUTPATIENT)
Dept: LAB | Facility: HOSPITAL | Age: 30
End: 2025-04-17
Payer: COMMERCIAL

## 2025-04-17 DIAGNOSIS — Z34.82 PRENATAL CARE, SUBSEQUENT PREGNANCY, SECOND TRIMESTER: ICD-10-CM

## 2025-04-17 LAB
BASOPHILS # BLD MANUAL: 0 THOUSAND/UL (ref 0–0.1)
BASOPHILS NFR MAR MANUAL: 0 % (ref 0–1)
EOSINOPHIL # BLD MANUAL: 0.23 THOUSAND/UL (ref 0–0.4)
EOSINOPHIL NFR BLD MANUAL: 2 % (ref 0–6)
ERYTHROCYTE [DISTWIDTH] IN BLOOD BY AUTOMATED COUNT: 13.8 % (ref 11.6–15.1)
GLUCOSE 1H P 50 G GLC PO SERPL-MCNC: 112 MG/DL (ref 70–134)
HCT VFR BLD AUTO: 34.8 % (ref 34.8–46.1)
HGB BLD-MCNC: 11.8 G/DL (ref 11.5–15.4)
LYMPHOCYTES # BLD AUTO: 1.86 THOUSAND/UL (ref 0.6–4.47)
LYMPHOCYTES # BLD AUTO: 15 % (ref 14–44)
MCH RBC QN AUTO: 32.4 PG (ref 26.8–34.3)
MCHC RBC AUTO-ENTMCNC: 33.9 G/DL (ref 31.4–37.4)
MCV RBC AUTO: 96 FL (ref 82–98)
MONOCYTES # BLD AUTO: 0.23 THOUSAND/UL (ref 0–1.22)
MONOCYTES NFR BLD: 2 % (ref 4–12)
MYELOCYTE ABSOLUTE CT: 0.23 THOUSAND/UL (ref 0–0.1)
MYELOCYTES NFR BLD MANUAL: 2 % (ref 0–1)
NEUTROPHILS # BLD MANUAL: 9.06 THOUSAND/UL (ref 1.85–7.62)
NEUTS SEG NFR BLD AUTO: 78 % (ref 43–75)
PLATELET # BLD AUTO: 204 THOUSANDS/UL (ref 149–390)
PLATELET BLD QL SMEAR: ADEQUATE
PMV BLD AUTO: 9.4 FL (ref 8.9–12.7)
RBC # BLD AUTO: 3.64 MILLION/UL (ref 3.81–5.12)
RBC MORPH BLD: NORMAL
VARIANT LYMPHS # BLD AUTO: 1 %
WBC # BLD AUTO: 11.62 THOUSAND/UL (ref 4.31–10.16)

## 2025-04-17 PROCEDURE — 85027 COMPLETE CBC AUTOMATED: CPT

## 2025-04-17 PROCEDURE — 82950 GLUCOSE TEST: CPT

## 2025-04-17 PROCEDURE — 85007 BL SMEAR W/DIFF WBC COUNT: CPT

## 2025-04-18 LAB — TREPONEMA PALLIDUM IGG+IGM AB [PRESENCE] IN SERUM OR PLASMA BY IMMUNOASSAY: NORMAL

## 2025-04-25 ENCOUNTER — ROUTINE PRENATAL (OUTPATIENT)
Age: 30
End: 2025-04-25
Payer: COMMERCIAL

## 2025-04-25 VITALS — WEIGHT: 152 LBS | DIASTOLIC BLOOD PRESSURE: 64 MMHG | BODY MASS INDEX: 27.8 KG/M2 | SYSTOLIC BLOOD PRESSURE: 92 MMHG

## 2025-04-25 DIAGNOSIS — Z23 NEED FOR TDAP VACCINATION: ICD-10-CM

## 2025-04-25 DIAGNOSIS — Z34.83 PRENATAL CARE, SUBSEQUENT PREGNANCY IN THIRD TRIMESTER: ICD-10-CM

## 2025-04-25 DIAGNOSIS — Z3A.31 31 WEEKS GESTATION OF PREGNANCY: Primary | ICD-10-CM

## 2025-04-25 LAB
SL AMB  POCT GLUCOSE, UA: ABNORMAL
SL AMB POCT URINE PROTEIN: ABNORMAL

## 2025-04-25 PROCEDURE — PNV: Performed by: OBSTETRICS & GYNECOLOGY

## 2025-04-25 PROCEDURE — 81002 URINALYSIS NONAUTO W/O SCOPE: CPT | Performed by: OBSTETRICS & GYNECOLOGY

## 2025-04-25 NOTE — PROGRESS NOTES
Patient is here for prenatal ob visit today.  GA: 31w0d  WINSTON: 2025  GP   Denies LOF, VB, CTX  +FM    Blue folder given at PN1  Yellow folder given today  Birth plan given today  Breast pump ordered  Tdap offered today. Pt declined. Counseled on risks.     Problem List Items Addressed This Visit     31 weeks gestation of pregnancy - Primary    Doing well overall. Plan for growth US for S<D    She prefers all female team if possible; explained process of residents/other obstetric providers and she is okay with this.    The patient was counseled about the labor process. She was counseled regarding potential reasons that she may need a  section including arrest of dilation/descent, non-reassuring fetal status, or worsening maternal status.      She was counseled on the risks of  including bleeding, infection, and injury to surrounding structures including the bowel and bladder. She was counseled that there are medical and surgical methods to manage excessive postpartum bleeding. She was counseled that in the event of excessive blood loss, she may require blood transfusion which includes a small risk of blood borne diseases such as hepatitis and HIV. The patient is OK with receiving a blood transfusion if necessary.  The patient had an opportunity to ask questions and signed consent.      She was counseled about the possible need for operative delivery using the vacuum or forceps and the indications for doing so. She was counseled that there is a small risk of  complications including intracranial hemorrhage, lacerations, nerve damage or fracture as well as the increased risk for more severe maternal laceration. The patient signed consent.            Relevant Orders    Ambulatory Referral to Maternal Fetal Medicine   Other Visit Diagnoses       Prenatal care, subsequent pregnancy in third trimester        Relevant Orders    POCT urine dip (Completed)      Need for Tdap vaccination

## 2025-04-25 NOTE — ASSESSMENT & PLAN NOTE
Doing well overall. Plan for growth US for S<D    She prefers all female team if possible; explained process of residents/other obstetric providers and she is okay with this.    The patient was counseled about the labor process. She was counseled regarding potential reasons that she may need a  section including arrest of dilation/descent, non-reassuring fetal status, or worsening maternal status.      She was counseled on the risks of  including bleeding, infection, and injury to surrounding structures including the bowel and bladder. She was counseled that there are medical and surgical methods to manage excessive postpartum bleeding. She was counseled that in the event of excessive blood loss, she may require blood transfusion which includes a small risk of blood borne diseases such as hepatitis and HIV. The patient is OK with receiving a blood transfusion if necessary.  The patient had an opportunity to ask questions and signed consent.      She was counseled about the possible need for operative delivery using the vacuum or forceps and the indications for doing so. She was counseled that there is a small risk of  complications including intracranial hemorrhage, lacerations, nerve damage or fracture as well as the increased risk for more severe maternal laceration. The patient signed consent.

## 2025-05-01 ENCOUNTER — ULTRASOUND (OUTPATIENT)
Dept: PERINATAL CARE | Facility: OTHER | Age: 30
End: 2025-05-01
Attending: OBSTETRICS & GYNECOLOGY
Payer: COMMERCIAL

## 2025-05-01 VITALS
HEIGHT: 62 IN | DIASTOLIC BLOOD PRESSURE: 58 MMHG | WEIGHT: 155 LBS | SYSTOLIC BLOOD PRESSURE: 110 MMHG | BODY MASS INDEX: 28.52 KG/M2 | HEART RATE: 103 BPM

## 2025-05-01 DIAGNOSIS — O09.813 PREGNANCY RESULTING FROM ASSISTED REPRODUCTIVE TECHNOLOGY IN THIRD TRIMESTER: ICD-10-CM

## 2025-05-01 DIAGNOSIS — Z3A.31 31 WEEKS GESTATION OF PREGNANCY: Primary | ICD-10-CM

## 2025-05-01 PROCEDURE — 76816 OB US FOLLOW-UP PER FETUS: CPT | Performed by: OBSTETRICS & GYNECOLOGY

## 2025-05-01 PROCEDURE — 99212 OFFICE O/P EST SF 10 MIN: CPT | Performed by: OBSTETRICS & GYNECOLOGY

## 2025-05-01 NOTE — PROGRESS NOTES
The patient was seen today for an ultrasound.  Please see ultrasound report (located under Ob Procedures) for additional details.   Thank you very much for allowing us to participate in the care of this very nice patient.  Should you have any questions, please do not hesitate to contact me.     Pierre Erazo MD FACOG  Attending Physician, Maternal-Fetal Medicine  [facility]

## 2025-05-06 PROBLEM — Z3A.32 32 WEEKS GESTATION OF PREGNANCY: Status: ACTIVE | Noted: 2025-01-31

## 2025-05-06 NOTE — PROGRESS NOTES
Name: Christian Ellis      : 1995      MRN: [id number]  Encounter Provider: MARIO Cat  Encounter Date: 2025   Encounter department: [facility] [place]  :  Assessment & Plan  Prenatal care, subsequent pregnancy in third trimester  She feels well.   She denies LOF/CTX/VB.   She did not voice any concerns.   Discussed fetal kick counting.  Overview charting updated today.         32 weeks gestation of pregnancy  Up-to-date on prenatal care  Ordered her breast pump  Forgot to bring her birth plan, will do so  Orders:    POCT urine dip    Varicose veins during pregnancy  Encouraged to use her compression stockings.  Discussed relief measures  Encouraged calcium support without phosphorus, will restart her milk that she used to drink.

## 2025-05-06 NOTE — PATIENT INSTRUCTIONS
Patient Education     Pregnancy - The Seventh Month   About this topic   It is important for you to learn how to take care of yourself to help you have a healthy baby and safe delivery. It is good to have health care throughout your pregnancy.  The seventh month of your pregnancy starts around week 29 and lasts through week 32. By knowing how far along you are, you can learn what is normal for this stage of your pregnancy and plan for what is next.  General   Your body   During the seventh month of your pregnancy, here are some things you can expect.  You may:  Have weight gain of about 15 to 20 pounds (6.8 to 9 kg) total in your first 7 months.  Have more trouble moving about and sleeping as the baby gets bigger  Have very vivid dreams  Notice more vaginal discharge  Notice a creamy, watery substance leaking from your nipples  Need a shot called Rh immune globulin if your blood type may be different from your baby's  Your baby's growth and development:  Your baby is gaining weight and adding layers of fat.  Your baby turns to be head down, into the position for delivery.  They are able to respond to loud noises and to dream.  Your baby moves at least 10 times in 2 hours. If your baby isn't moving this much, talk to your doctor right away.  Your baby is about 16 inches (42 cm) long and weighs about 4 pounds (1,800 gm). Your baby is about the size of squash.  Things to Think About   Avoid alcohol, drugs, tobacco products, and second hand smoke  Think about what you want your baby's birth to be like. Who do you want with you?  Find out about what lactation services are covered by your insurance.  Look into lactation consultants and breastfeeding classes.  Where do you want to deliver? It’s time to make a plan for labor and delivery.  Plan on getting a car seat and other things for your baby.  Talk to your doctor if you plan to travel or get on a plane.  When do I need to call the doctor?   Contractions every 10  minutes or more often that do not go away with drinking water or position changes.  Low, dull back pain that does not go away  Feeling unusually dizzy or tired  Pressure in your pelvis that feels like your baby is pushing down  A gush or constant trickle of watery or bloody fluid leaking from your vagina  Cramps in your lower belly that come and go or are constant  Little to no movement felt by baby in 2 hours. Your baby should be moving at least 10 times every 2 hours.  Headache that does not go away; blurry vision; seeing spots or halos; increase in swelling in your hands, feet, or face; and pain under your ribs on the right side  Vaginal bleeding with or without pain  Fever of 100.4°F (38°C) or higher  After a car accident, fall, or any trauma to your belly  Having thoughts of harming yourself or others, or do not feel safe at home  Last Reviewed Date   2020-05-06  Consumer Information Use and Disclaimer   This generalized information is a limited summary of diagnosis, treatment, and/or medication information. It is not meant to be comprehensive and should be used as a tool to help the user understand and/or assess potential diagnostic and treatment options. It does NOT include all information about conditions, treatments, medications, side effects, or risks that may apply to a specific patient. It is not intended to be medical advice or a substitute for the medical advice, diagnosis, or treatment of a health care provider based on the health care provider's examination and assessment of a patient’s specific and unique circumstances. Patients must speak with a health care provider for complete information about their health, medical questions, and treatment options, including any risks or benefits regarding use of medications. This information does not endorse any treatments or medications as safe, effective, or approved for treating a specific patient. Up[employer], Inc. and its affiliates disclaim any warranty or  liability relating to this information or the use thereof. The use of this information is governed by the Terms of Use, available at https://www.wolterskluwer.com/en/know/clinical-effectiveness-terms   Copyright   Copyright © 2024 [employer] and its affiliates and/or licensors. All rights reserved.

## 2025-05-06 NOTE — ASSESSMENT & PLAN NOTE
Up-to-date on prenatal care  Ordered her breast pump  Forgot to bring her birth plan, will do so  Orders:    POCT urine dip

## 2025-05-08 ENCOUNTER — ROUTINE PRENATAL (OUTPATIENT)
Age: 30
End: 2025-05-08
Payer: COMMERCIAL

## 2025-05-08 VITALS
OXYGEN SATURATION: 100 % | BODY MASS INDEX: 27.07 KG/M2 | HEART RATE: 94 BPM | DIASTOLIC BLOOD PRESSURE: 70 MMHG | SYSTOLIC BLOOD PRESSURE: 118 MMHG | WEIGHT: 148 LBS

## 2025-05-08 DIAGNOSIS — O22.00 VARICOSE VEINS DURING PREGNANCY: ICD-10-CM

## 2025-05-08 DIAGNOSIS — Z34.83 PRENATAL CARE, SUBSEQUENT PREGNANCY IN THIRD TRIMESTER: Primary | ICD-10-CM

## 2025-05-08 DIAGNOSIS — Z3A.32 32 WEEKS GESTATION OF PREGNANCY: ICD-10-CM

## 2025-05-08 LAB
SL AMB  POCT GLUCOSE, UA: NORMAL
SL AMB POCT URINE PROTEIN: NORMAL

## 2025-05-08 PROCEDURE — 81002 URINALYSIS NONAUTO W/O SCOPE: CPT | Performed by: NURSE PRACTITIONER

## 2025-05-08 PROCEDURE — PNV: Performed by: NURSE PRACTITIONER

## 2025-05-08 NOTE — ASSESSMENT & PLAN NOTE
Encouraged to use her compression stockings.  Discussed relief measures  Encouraged calcium support without phosphorus, will restart her milk that she used to drink.

## 2025-05-09 LAB
DME PARACHUTE DELIVERY DATE REQUESTED: NORMAL
DME PARACHUTE ITEM DESCRIPTION: NORMAL
DME PARACHUTE ORDER STATUS: NORMAL
DME PARACHUTE SUPPLIER NAME: NORMAL
DME PARACHUTE SUPPLIER PHONE: NORMAL

## 2025-05-20 PROBLEM — Z3A.34 34 WEEKS GESTATION OF PREGNANCY: Status: ACTIVE | Noted: 2025-01-31

## 2025-05-20 LAB
DME PARACHUTE DELIVERY DATE ACTUAL: NORMAL
DME PARACHUTE DELIVERY DATE REQUESTED: NORMAL
DME PARACHUTE ITEM DESCRIPTION: NORMAL
DME PARACHUTE ORDER STATUS: NORMAL
DME PARACHUTE SUPPLIER NAME: NORMAL
DME PARACHUTE SUPPLIER PHONE: NORMAL

## 2025-05-20 NOTE — PATIENT INSTRUCTIONS
Patient Education     Pregnancy - The Eighth Month   About this topic   It is important for you to learn how to take care of yourself to help you have a healthy baby and safe delivery. It is good to have health care throughout your pregnancy.  The eighth month of your pregnancy starts around week 33 and lasts through week 36. By knowing how far along you are, you can learn what is normal for this stage of your pregnancy and plan for what is next.  General   Your body   During the eighth month of your pregnancy, here are some things you can expect.  You may:  Be feeling more tired. Rest as much as you can and take small naps if possible. This also helps with swollen feet and ankles.  Feel hot all the time. Be sure to drink plenty of water.  Notice your baby drops more into your pelvis. This makes breathing a bit easier, but you may have to go to the bathroom more often. You may also notice more problems with hemorrhoids.  Have more back pain  Notice clear jelly-like substance flecked with blood when you use the restroom. This is your mucus plug.  Feel less steady on your feet. This is because your hips and joints are preparing for birth.  Be tested for Group Beta Strep (GBS) to see if you will need antibiotics during labor  Gain about 1/2 to 1 pound (.23 to .45 kg) a week for the rest of your pregnancy. It is normal to gain about 5 to 10 pounds (2.3 to 4.5 kg) total in your first 4 months.  Have a BPP, or Biophysical Profile. The doctors do an ultrasound to check your baby’s health if you are at high risk or having problems.  Have a NST, or Non Stress Test. The staff place special monitors around your belly to check the baby’s heart rate and look for contractions.  Your baby's growth and development:  Your baby is almost fully mature but will spend the rest of the time inside of you getting bigger.  Their lungs are the last organ to mature, so it is important that your baby stays in your womb until your due date if  possible.  Their bones are getting stronger each day. The bones in their skull stay a little softer to make delivery easier.  They are able to scratch themselves as their fingernails are developed.  Your baby is becoming protected from germs as they develop antibodies.  They are moving a little less because there is less room.  Your baby is about 19 inches (48 cm) long and weighs about 6 pounds (2,700 gm). Your baby is about the size of a papaya or pineapple.  Things to Think About   Avoid alcohol, drugs, tobacco products, and second hand smoke.  Be sure you know the signs of labor and when to call your doctor.  Are you planning a natural childbirth or thinking about an epidural? Know things you can do to help cope with labor pain.  You may want to learn about cord blood banking.  Do you have everything you need for after the baby is born? Be sure the car seat fits in the car.  When do I need to call the doctor?   Contractions every 10 minutes or more often that do not go away with drinking water or position changes  Headache that does not go away; blurry vision; seeing spots or halos; increase in swelling in your hands, feet, or face; and pain under your ribs on the right side  Low, dull back pain that does not go away  Pressure in your pelvis that feels like your baby is pushing down  A gush or constant trickle of watery or bloody fluid leaking from your vagina  Little to no movement felt by baby in 2 hours. Your baby should be moving at least 10 times every 2 hours.  Vaginal bleeding with or without pain  Fever of 100.4°F (38°C) or higher  After a car accident, fall, or any trauma to your belly  Having thoughts of harming yourself or others, or do not feel safe at home  Last Reviewed Date   2020-05-06  Consumer Information Use and Disclaimer   This generalized information is a limited summary of diagnosis, treatment, and/or medication information. It is not meant to be comprehensive and should be used as a tool  to help the user understand and/or assess potential diagnostic and treatment options. It does NOT include all information about conditions, treatments, medications, side effects, or risks that may apply to a specific patient. It is not intended to be medical advice or a substitute for the medical advice, diagnosis, or treatment of a health care provider based on the health care provider's examination and assessment of a patient’s specific and unique circumstances. Patients must speak with a health care provider for complete information about their health, medical questions, and treatment options, including any risks or benefits regarding use of medications. This information does not endorse any treatments or medications as safe, effective, or approved for treating a specific patient. UpToDate, Inc. and its affiliates disclaim any warranty or liability relating to this information or the use thereof. The use of this information is governed by the Terms of Use, available at https://www.[employer].com/en/know/clinical-effectiveness-terms   Copyright   Copyright © 2024 UpToDate, Inc. and its affiliates and/or licensors. All rights reserved.

## 2025-05-20 NOTE — PROGRESS NOTES
Assessment & Plan  Prenatal care, subsequent pregnancy in third trimester  She feels well.   She denies LOF/CTX/VB.   She did not voice any concerns.   Discussed fetal kick counting.  Encouraged to start her perineal/vaginal massages.  Overview charting updated today.        34 weeks gestation of pregnancy  Up-to-date on prenatal care  Ordered her breast pump  Forgot to bring her birth plan, will do so  TWG 13.9 kg (30 lb 9.6 oz)    Orders:    POCT urine dip     Varicose veins during pregnancy  Encouraged to use her compression stockings.  Discussed relief measures  Encouraged calcium support without phosphorus, will restart her milk that she used to drink.

## 2025-05-22 ENCOUNTER — TELEPHONE (OUTPATIENT)
Age: 30
End: 2025-05-22

## 2025-05-22 ENCOUNTER — ROUTINE PRENATAL (OUTPATIENT)
Age: 30
End: 2025-05-22
Payer: COMMERCIAL

## 2025-05-22 VITALS
SYSTOLIC BLOOD PRESSURE: 102 MMHG | WEIGHT: 144.6 LBS | DIASTOLIC BLOOD PRESSURE: 62 MMHG | HEART RATE: 92 BPM | BODY MASS INDEX: 26.61 KG/M2 | HEIGHT: 62 IN

## 2025-05-22 DIAGNOSIS — Z34.83 PRENATAL CARE, SUBSEQUENT PREGNANCY IN THIRD TRIMESTER: Primary | ICD-10-CM

## 2025-05-22 DIAGNOSIS — O22.00 VARICOSE VEINS DURING PREGNANCY: ICD-10-CM

## 2025-05-22 DIAGNOSIS — Z3A.34 34 WEEKS GESTATION OF PREGNANCY: ICD-10-CM

## 2025-05-22 DIAGNOSIS — O09.813 PREGNANCY RESULTING FROM ASSISTED REPRODUCTIVE TECHNOLOGY IN THIRD TRIMESTER: ICD-10-CM

## 2025-05-22 LAB
SL AMB  POCT GLUCOSE, UA: NORMAL
SL AMB POCT URINE PROTEIN: NORMAL

## 2025-05-22 PROCEDURE — 81002 URINALYSIS NONAUTO W/O SCOPE: CPT | Performed by: NURSE PRACTITIONER

## 2025-05-22 PROCEDURE — PNV: Performed by: NURSE PRACTITIONER

## 2025-05-22 NOTE — TELEPHONE ENCOUNTER
Patient was seen by OBGYN . Needs FMLA paperwork filled out. Called patient to return to office to fill out [employer] required paperwork for FMLA. Paperwork left @ [employer] [facility].

## 2025-05-22 NOTE — PROGRESS NOTES
Patient is here for prenatal ob visit today.  GA: 34w6d  WINSTON: 2025  GP:   Blood type: A positive  Denies LOF, VB, CTX  +FM    Blue folder given at PN1  Yellow folder given   Delivery consent on file  Birth plan not completed yet  Breast pump ordered  Tdap declined  No questions or concerns at this time.

## 2025-05-22 NOTE — TELEPHONE ENCOUNTER
Left message for pt regarding completion of FMLA paperwork with pt needing to sign and date FMLA that was filled out this morning and dropped off by pt this morning

## 2025-05-29 ENCOUNTER — TELEPHONE (OUTPATIENT)
Age: 30
End: 2025-05-29

## 2025-05-29 NOTE — TELEPHONE ENCOUNTER
Patient called stating that she has to work on 6/3/25 and is requesting to come in on 6/5/25. Can we fit the patient in for that day?

## 2025-06-04 NOTE — PROGRESS NOTES
Prenatal Visit  Name: Christian Ellis      : 1995      MRN: [id number]  Encounter Provider: Eva Duncan PA-C  Encounter Date: 2025   Encounter department: [facility] [place]    :  Assessment & Plan  36 weeks gestation of pregnancy  Blood Type:A+  Pap -  NILM  GC/CT -  negative  28 Week Labs- WNL     Flu vaccine - declined  Blue folder- has  Yellow folder- has     NIPS-  low risk  AFP- low risk     Level 2- 24      TDAP declined      Delivery consent- signed  Breast pump - ordered  L&D forms- provided with a new packet    GBS swab - collected today  IOL -  Orders:    POCT urine dip    Strep B DNA probe, amplification    Prenatal care, subsequent pregnancy in third trimester     36w5d  No S/S Labor, fetus remains active.  GBS culture was collected at this visit.   We reviewed the following today:  Labor: I have reviewed the signs and symptoms of labor with the patient, including contractions q4-5 minutes for greater than 1 hour, vaginal bleeding, leaking fluid and decreased fetal movement.  I have emphasized the continued importance of paying close attention to the baby's movements.  I have instructed the patient to call the office with any of the above symptoms prior to coming to the hospital.   Anesthesia: I have reviewed the options for anesthesia in labor, including IV medications in early labor, regional anesthesia with an epidural or spinal, local anesthesia or general anesthesia if needed for a .  I have reviewed that the anesthesia staff will see every patient on the labor floor to be prepared in the event of an emergency.  They will review the risks and benefits of each option and sign an anesthesia consent.  Return to office in 1 week or sooner if needed        History of Present Illness   History of Present Illness     Christian is a 30 y.o.  36w5d here for No chief complaint on file.    Denies unusual vaginal discharge, LOF, VB, or ctx.  Reports Fetus is active.         Objective   /70 (BP Location: Left arm, Patient Position: Sitting, Cuff Size: Standard)   Wt 65.9 kg (145 lb 3.2 oz)   LMP  (LMP Unknown)   BMI 26.56 kg/m²     Pregravid Weight/BMI: 51.7 kg (114 lb) (BMI 20.85)  Total Weight Gain: 14.2 kg (31 lb 3.2 oz)     Physical Exam  Constitutional:       Appearance: Normal appearance.   Genitourinary:      Right Labia: No rash, tenderness or lesions.     Left Labia: No tenderness, lesions or rash.  HENT:      Head: Normocephalic and atraumatic.     Eyes:      Extraocular Movements: Extraocular movements intact.     Pulmonary:      Effort: Pulmonary effort is normal.     Musculoskeletal:         General: Normal range of motion.     Neurological:      Mental Status: She is alert.     Skin:     General: Skin is warm and dry.     Psychiatric:         Mood and Affect: Mood normal.         Behavior: Behavior normal.       Fetal Heart Rate: 135 , Fundal Height (cm): 36 cm  Eva Duncan PA-C

## 2025-06-05 ENCOUNTER — ROUTINE PRENATAL (OUTPATIENT)
Dept: OBGYN CLINIC | Facility: CLINIC | Age: 30
End: 2025-06-05
Payer: COMMERCIAL

## 2025-06-05 VITALS — SYSTOLIC BLOOD PRESSURE: 104 MMHG | WEIGHT: 145.2 LBS | BODY MASS INDEX: 26.56 KG/M2 | DIASTOLIC BLOOD PRESSURE: 70 MMHG

## 2025-06-05 DIAGNOSIS — Z3A.36 36 WEEKS GESTATION OF PREGNANCY: Primary | ICD-10-CM

## 2025-06-05 DIAGNOSIS — Z34.83 PRENATAL CARE, SUBSEQUENT PREGNANCY IN THIRD TRIMESTER: ICD-10-CM

## 2025-06-05 LAB
SL AMB  POCT GLUCOSE, UA: NORMAL
SL AMB POCT URINE PROTEIN: NORMAL

## 2025-06-05 PROCEDURE — 87150 DNA/RNA AMPLIFIED PROBE: CPT

## 2025-06-05 PROCEDURE — PNV

## 2025-06-05 PROCEDURE — 81002 URINALYSIS NONAUTO W/O SCOPE: CPT

## 2025-06-09 ENCOUNTER — RESULTS FOLLOW-UP (OUTPATIENT)
Dept: OBGYN CLINIC | Facility: CLINIC | Age: 30
End: 2025-06-09

## 2025-06-09 LAB — GP B STREP DNA SPEC QL NAA+PROBE: NEGATIVE

## 2025-06-09 NOTE — PROGRESS NOTES
Prenatal Visit  Name: Christian Ellis      : 1995      MRN: [id number]  Encounter Provider: Eva Duncan PA-C  Encounter Date: 2025   Encounter department: [facility] [place]    :  Assessment & Plan  37 weeks gestation of pregnancy     Blood Type:A+  Pap -  NILM  GC/CT -  negative  28 Week Labs- WNL     Flu vaccine - declined  Blue folder- has  Yellow folder- has     NIPS-  low risk  AFP- low risk     Level 2- 24      TDAP declined      Delivery consent- signed  Breast pump - ordered  L&D forms- signed     GBS swab - collected today  IOL - declined    Prenatal care, subsequent pregnancy in third trimester    , 37w5d  No major complaints today. Reports good FM    We again reviewed the S/S PTL and importance of consistent/regular FM. Reviewed process for [facility] and encouraged pt to call with any decreases in fetal movement    RTO in 1 week or sooner if needed          History of Present Illness   History of Present Illness     Christian is a 30 y.o.  37w5d here for Routine Prenatal Visit (37w5d)    Denies unusual vaginal discharge, LOF, VB, or ctx. Reports Fetus is active.       Objective   /78 (BP Location: Left arm, Patient Position: Sitting, Cuff Size: Standard)   Wt 67 kg (147 lb 12.8 oz)   LMP  (LMP Unknown)   BMI 27.03 kg/m²     Pregravid Weight/BMI: 51.7 kg (114 lb) (BMI 20.85)  Total Weight Gain: 15.3 kg (33 lb 12.8 oz)     Physical Exam  Constitutional:       Appearance: Normal appearance.   HENT:      Head: Normocephalic and atraumatic.     Eyes:      Extraocular Movements: Extraocular movements intact.     Pulmonary:      Effort: Pulmonary effort is normal.     Musculoskeletal:         General: Normal range of motion.     Neurological:      Mental Status: She is alert.     Skin:     General: Skin is warm and dry.     Psychiatric:         Mood and Affect: Mood normal.         Behavior: Behavior normal.       Fetal Heart Rate:  145  Fundal Height (cm): 37 cm    Eva Duncan PA-C

## 2025-06-11 ENCOUNTER — ROUTINE PRENATAL (OUTPATIENT)
Age: 30
End: 2025-06-11

## 2025-06-11 VITALS — SYSTOLIC BLOOD PRESSURE: 110 MMHG | WEIGHT: 147.8 LBS | DIASTOLIC BLOOD PRESSURE: 78 MMHG | BODY MASS INDEX: 27.03 KG/M2

## 2025-06-11 DIAGNOSIS — Z34.83 PRENATAL CARE, SUBSEQUENT PREGNANCY IN THIRD TRIMESTER: ICD-10-CM

## 2025-06-11 DIAGNOSIS — Z3A.37 37 WEEKS GESTATION OF PREGNANCY: Primary | ICD-10-CM

## 2025-06-11 PROCEDURE — PNV

## 2025-06-11 NOTE — ASSESSMENT & PLAN NOTE
Blood Type:A+  Pap - 2022 NILM  GC/CT -  negative  28 Week Labs- WNL     Flu vaccine - declined  Blue folder- has  Yellow folder- has     NIPS-  low risk  AFP- low risk     Level 2- 2/7/24      TDAP declined      Delivery consent- signed  Breast pump - ordered  L&D forms- signed     GBS swab - collected today  IOL - declined

## 2025-06-19 ENCOUNTER — ROUTINE PRENATAL (OUTPATIENT)
Age: 30
End: 2025-06-19

## 2025-06-19 VITALS — WEIGHT: 145.6 LBS | BODY MASS INDEX: 26.63 KG/M2 | SYSTOLIC BLOOD PRESSURE: 94 MMHG | DIASTOLIC BLOOD PRESSURE: 72 MMHG

## 2025-06-19 DIAGNOSIS — Z3A.38 38 WEEKS GESTATION OF PREGNANCY: Primary | ICD-10-CM

## 2025-06-19 PROCEDURE — PNV: Performed by: OBSTETRICS & GYNECOLOGY

## 2025-06-19 NOTE — PROGRESS NOTES
Patient is here for prenatal ob visit today.  GA: 38w6d  WINSTON: 2025  GP:   Blood type: A positive  Denies LOF, VB, CTX  +FM    Labs utd    Blue folder given at PN1  Yellow folder given   Delivery consent on file  Birth plan on file. 1 page received today.  IOL declined  Breast pump ordered  Tdap declined  GBS neg    Would like cervix check.

## 2025-06-19 NOTE — PROGRESS NOTES
Problem List Items Addressed This Visit       38 weeks gestation of pregnancy - Primary    Pt doing well today  +FM. Denies ctx, vb and lof.   GBS negative   SVE today 1.5/80/-2  Declines IOL prior to 41 weeks.  Precautions reviewed. RTO in 1 week            Christian Ellis is a 30 y.o.  at 38w6d who presents today for routine prenatal visit.     BP 94/72 (BP Location: Left arm, Patient Position: Sitting, Cuff Size: Adult)   Wt 66 kg (145 lb 9.6 oz)   LMP  (LMP Unknown)   BMI 26.63 kg/m²       FH 38 cm

## 2025-06-19 NOTE — ASSESSMENT & PLAN NOTE
Pt doing well today  +FM. Denies ctx, vb and lof.   GBS negative   SVE today 1.5/80/-2  Declines IOL prior to 41 weeks.  Precautions reviewed. RTO in 1 week

## 2025-06-26 ENCOUNTER — ROUTINE PRENATAL (OUTPATIENT)
Age: 30
End: 2025-06-26

## 2025-06-26 VITALS
BODY MASS INDEX: 27.31 KG/M2 | HEART RATE: 88 BPM | DIASTOLIC BLOOD PRESSURE: 70 MMHG | SYSTOLIC BLOOD PRESSURE: 102 MMHG | HEIGHT: 62 IN | WEIGHT: 148.4 LBS

## 2025-06-26 DIAGNOSIS — Z3A.39 39 WEEKS GESTATION OF PREGNANCY: Primary | ICD-10-CM

## 2025-06-26 PROCEDURE — PNV: Performed by: OBSTETRICS & GYNECOLOGY

## 2025-06-26 NOTE — PROGRESS NOTES
"Problem List Items Addressed This Visit       39 weeks gestation of pregnancy - Primary    Pt doing well today  +FM. Denies ctx, vb and lof.   Some cramping- irregular.   GBS negative  SVE today- /-2, membrane sweep per pt request.   Growth u/s at 32w EFW 64%Ile  Hoping for spontaneous labor- IOL consents signed today for 41w induction if still pregnant.   Precautions reviewed. RTO in 1 week.             Christian Ellis is a 30 y.o.  at 39w6d who presents today for routine prenatal visit.     /70 (BP Location: Left arm, Patient Position: Sitting, Cuff Size: Adult)   Pulse 88   Ht 5' 2\" (1.575 m)   Wt 67.3 kg (148 lb 6.4 oz)   LMP  (LMP Unknown)   BMI 27.14 kg/m²       FH 40 cm      "

## 2025-06-26 NOTE — ASSESSMENT & PLAN NOTE
Pt doing well today  +FM. Denies ctx, vb and lof.   Some cramping- irregular.   GBS negative  SVE today- 2/80/-2, membrane sweep per pt request.   Growth u/s at 32w EFW 64%Ile  Hoping for spontaneous labor- IOL consents signed today for 41w induction if still pregnant.   Precautions reviewed. RTO in 1 week.

## 2025-06-28 ENCOUNTER — NURSE TRIAGE (OUTPATIENT)
Dept: OTHER | Facility: OTHER | Age: 30
End: 2025-06-28

## 2025-06-29 ENCOUNTER — ANESTHESIA (INPATIENT)
Dept: ANESTHESIOLOGY | Facility: HOSPITAL | Age: 30
End: 2025-06-29
Payer: COMMERCIAL

## 2025-06-29 ENCOUNTER — HOSPITAL ENCOUNTER (INPATIENT)
Facility: HOSPITAL | Age: 30
LOS: 2 days | Discharge: HOME | End: 2025-07-01
Attending: STUDENT IN AN ORGANIZED HEALTH CARE EDUCATION/TRAINING PROGRAM | Admitting: STUDENT IN AN ORGANIZED HEALTH CARE EDUCATION/TRAINING PROGRAM
Payer: COMMERCIAL

## 2025-06-29 ENCOUNTER — CLINICAL DOCUMENTATION ONLY (OUTPATIENT)
Dept: NURSERY | Facility: HOSPITAL | Age: 30
End: 2025-06-29

## 2025-06-29 ENCOUNTER — ANESTHESIA EVENT (INPATIENT)
Dept: ANESTHESIOLOGY | Facility: HOSPITAL | Age: 30
End: 2025-06-29
Payer: COMMERCIAL

## 2025-06-29 DIAGNOSIS — Z3A.40 40 WEEKS GESTATION OF PREGNANCY: Primary | ICD-10-CM

## 2025-06-29 LAB
ABO GROUP BLD: NORMAL
BASE EXCESS BLDCOA CALC-SCNC: -7.5 MMOL/L (ref 3–11)
BASE EXCESS BLDCOV CALC-SCNC: -4.5 MMOL/L (ref 1–9)
BLD GP AB SCN SERPL QL: NEGATIVE
ERYTHROCYTE [DISTWIDTH] IN BLOOD BY AUTOMATED COUNT: 13.6 % (ref 11.6–15.1)
HCO3 BLDCOA-SCNC: 19.6 MMOL/L (ref 17.3–27.3)
HCO3 BLDCOV-SCNC: 20.5 MMOL/L (ref 12.2–28.6)
HCT VFR BLD AUTO: 38.8 % (ref 34.8–46.1)
HGB BLD-MCNC: 13.8 G/DL (ref 11.5–15.4)
HOLD SPECIMEN: NORMAL
MCH RBC QN AUTO: 33 PG (ref 26.8–34.3)
MCHC RBC AUTO-ENTMCNC: 35.6 G/DL (ref 31.4–37.4)
MCV RBC AUTO: 93 FL (ref 82–98)
O2 CT VFR BLDCOA CALC: 22 ML/DL
OXYHGB MFR BLDCOA: 88.7 %
OXYHGB MFR BLDCOV: 65.2 %
PCO2 BLDCOA: 45 MM[HG] (ref 30–60)
PCO2 BLDCOV: 38.2 MM HG (ref 27–43)
PH BLDCOA: 7.26 [PH] (ref 7.23–7.43)
PH BLDCOV: 7.35 [PH] (ref 7.19–7.49)
PLATELET # BLD AUTO: 277 THOUSANDS/UL (ref 149–390)
PMV BLD AUTO: 10.1 FL (ref 8.9–12.7)
PO2 BLDCOA: 52.8 MM HG (ref 5–25)
PO2 BLDCOV: 26.7 MM HG (ref 15–45)
RBC # BLD AUTO: 4.18 MILLION/UL (ref 3.81–5.12)
RH BLD: POSITIVE
SAO2 % BLDCOV: 15.5 ML/DL
SPECIMEN EXPIRATION DATE: NORMAL
TREPONEMA PALLIDUM IGG+IGM AB [PRESENCE] IN SERUM OR PLASMA BY IMMUNOASSAY: NORMAL
WBC # BLD AUTO: 17.08 THOUSAND/UL (ref 4.31–10.16)

## 2025-06-29 PROCEDURE — 3E0R3BZ INTRODUCTION OF ANESTHETIC AGENT INTO SPINAL CANAL, PERCUTANEOUS APPROACH: ICD-10-PCS | Performed by: NURSE ANESTHETIST, CERTIFIED REGISTERED

## 2025-06-29 PROCEDURE — 86850 RBC ANTIBODY SCREEN: CPT

## 2025-06-29 PROCEDURE — 86900 BLOOD TYPING SEROLOGIC ABO: CPT

## 2025-06-29 PROCEDURE — NC001 PR NO CHARGE: Performed by: STUDENT IN AN ORGANIZED HEALTH CARE EDUCATION/TRAINING PROGRAM

## 2025-06-29 PROCEDURE — 0KQM0ZZ REPAIR PERINEUM MUSCLE, OPEN APPROACH: ICD-10-PCS | Performed by: STUDENT IN AN ORGANIZED HEALTH CARE EDUCATION/TRAINING PROGRAM

## 2025-06-29 PROCEDURE — 59400 OBSTETRICAL CARE: CPT | Performed by: STUDENT IN AN ORGANIZED HEALTH CARE EDUCATION/TRAINING PROGRAM

## 2025-06-29 PROCEDURE — 99215 OFFICE O/P EST HI 40 MIN: CPT

## 2025-06-29 PROCEDURE — 85027 COMPLETE CBC AUTOMATED: CPT

## 2025-06-29 PROCEDURE — 86901 BLOOD TYPING SEROLOGIC RH(D): CPT

## 2025-06-29 PROCEDURE — 82805 BLOOD GASES W/O2 SATURATION: CPT | Performed by: STUDENT IN AN ORGANIZED HEALTH CARE EDUCATION/TRAINING PROGRAM

## 2025-06-29 PROCEDURE — G0463 HOSPITAL OUTPT CLINIC VISIT: HCPCS

## 2025-06-29 PROCEDURE — 86780 TREPONEMA PALLIDUM: CPT

## 2025-06-29 RX ORDER — TRANEXAMIC ACID 10 MG/ML
1000 INJECTION, SOLUTION INTRAVENOUS ONCE
Status: COMPLETED | OUTPATIENT
Start: 2025-06-30 | End: 2025-06-29

## 2025-06-29 RX ORDER — DOCUSATE SODIUM 100 MG/1
100 CAPSULE, LIQUID FILLED ORAL 2 TIMES DAILY
Status: DISCONTINUED | OUTPATIENT
Start: 2025-06-30 | End: 2025-07-01 | Stop reason: HOSPADM

## 2025-06-29 RX ORDER — OXYTOCIN/0.9 % SODIUM CHLORIDE 30/500 ML
1-30 PLASTIC BAG, INJECTION (ML) INTRAVENOUS
Status: DISCONTINUED | OUTPATIENT
Start: 2025-06-29 | End: 2025-06-29

## 2025-06-29 RX ORDER — BENZOCAINE/MENTHOL 6 MG-10 MG
1 LOZENGE MUCOUS MEMBRANE DAILY PRN
Status: DISCONTINUED | OUTPATIENT
Start: 2025-06-29 | End: 2025-07-01 | Stop reason: HOSPADM

## 2025-06-29 RX ORDER — TRANEXAMIC ACID 10 MG/ML
INJECTION, SOLUTION INTRAVENOUS
Status: COMPLETED
Start: 2025-06-29 | End: 2025-06-29

## 2025-06-29 RX ORDER — ONDANSETRON 2 MG/ML
4 INJECTION INTRAMUSCULAR; INTRAVENOUS EVERY 6 HOURS PRN
Status: DISCONTINUED | OUTPATIENT
Start: 2025-06-29 | End: 2025-06-29

## 2025-06-29 RX ORDER — CALCIUM CARBONATE 500 MG/1
1000 TABLET, CHEWABLE ORAL DAILY PRN
Status: DISCONTINUED | OUTPATIENT
Start: 2025-06-29 | End: 2025-07-01 | Stop reason: HOSPADM

## 2025-06-29 RX ORDER — ACETAMINOPHEN 10 MG/ML
1000 INJECTION, SOLUTION INTRAVENOUS ONCE
Status: COMPLETED | OUTPATIENT
Start: 2025-06-29 | End: 2025-06-29

## 2025-06-29 RX ORDER — IBUPROFEN 600 MG/1
600 TABLET, FILM COATED ORAL EVERY 6 HOURS SCHEDULED
Status: DISCONTINUED | OUTPATIENT
Start: 2025-06-30 | End: 2025-07-01 | Stop reason: HOSPADM

## 2025-06-29 RX ORDER — ACETAMINOPHEN 325 MG/1
975 TABLET ORAL EVERY 6 HOURS PRN
Status: DISCONTINUED | OUTPATIENT
Start: 2025-06-29 | End: 2025-06-29

## 2025-06-29 RX ORDER — BUTORPHANOL TARTRATE 1 MG/ML
1 INJECTION, SOLUTION INTRAMUSCULAR; INTRAVENOUS ONCE
Status: COMPLETED | OUTPATIENT
Start: 2025-06-29 | End: 2025-06-29

## 2025-06-29 RX ORDER — DIPHENHYDRAMINE HYDROCHLORIDE 50 MG/ML
25 INJECTION, SOLUTION INTRAMUSCULAR; INTRAVENOUS EVERY 6 HOURS PRN
Status: DISCONTINUED | OUTPATIENT
Start: 2025-06-29 | End: 2025-07-01 | Stop reason: HOSPADM

## 2025-06-29 RX ORDER — ONDANSETRON 2 MG/ML
4 INJECTION INTRAMUSCULAR; INTRAVENOUS EVERY 8 HOURS PRN
Status: DISCONTINUED | OUTPATIENT
Start: 2025-06-29 | End: 2025-07-01 | Stop reason: HOSPADM

## 2025-06-29 RX ORDER — BUPIVACAINE HYDROCHLORIDE 2.5 MG/ML
30 INJECTION, SOLUTION EPIDURAL; INFILTRATION; INTRACAUDAL; PERINEURAL ONCE AS NEEDED
Status: DISCONTINUED | OUTPATIENT
Start: 2025-06-29 | End: 2025-06-29

## 2025-06-29 RX ORDER — POLYETHYLENE GLYCOL 3350 17 G/17G
17 POWDER, FOR SOLUTION ORAL DAILY PRN
Status: DISCONTINUED | OUTPATIENT
Start: 2025-06-29 | End: 2025-07-01 | Stop reason: HOSPADM

## 2025-06-29 RX ORDER — OXYTOCIN/0.9 % SODIUM CHLORIDE 30/500 ML
250 PLASTIC BAG, INJECTION (ML) INTRAVENOUS ONCE
Status: DISCONTINUED | OUTPATIENT
Start: 2025-06-30 | End: 2025-07-01 | Stop reason: HOSPADM

## 2025-06-29 RX ORDER — SODIUM CHLORIDE, SODIUM LACTATE, POTASSIUM CHLORIDE, CALCIUM CHLORIDE 600; 310; 30; 20 MG/100ML; MG/100ML; MG/100ML; MG/100ML
125 INJECTION, SOLUTION INTRAVENOUS CONTINUOUS
Status: DISCONTINUED | OUTPATIENT
Start: 2025-06-29 | End: 2025-06-29

## 2025-06-29 RX ORDER — LIDOCAINE HYDROCHLORIDE AND EPINEPHRINE 15; 5 MG/ML; UG/ML
INJECTION, SOLUTION EPIDURAL
Status: COMPLETED | OUTPATIENT
Start: 2025-06-29 | End: 2025-06-29

## 2025-06-29 RX ORDER — ACETAMINOPHEN 325 MG/1
975 TABLET ORAL EVERY 6 HOURS SCHEDULED
Status: DISCONTINUED | OUTPATIENT
Start: 2025-06-30 | End: 2025-07-01 | Stop reason: HOSPADM

## 2025-06-29 RX ADMIN — Medication 2 MILLI-UNITS/MIN: at 17:39

## 2025-06-29 RX ADMIN — LIDOCAINE HYDROCHLORIDE AND EPINEPHRINE 3 ML: 15; 5 INJECTION, SOLUTION EPIDURAL at 16:34

## 2025-06-29 RX ADMIN — SODIUM CHLORIDE, SODIUM LACTATE, POTASSIUM CHLORIDE, AND CALCIUM CHLORIDE 985 ML/HR: .6; .31; .03; .02 INJECTION, SOLUTION INTRAVENOUS at 21:36

## 2025-06-29 RX ADMIN — SODIUM CHLORIDE, SODIUM LACTATE, POTASSIUM CHLORIDE, AND CALCIUM CHLORIDE 125 ML/HR: .6; .31; .03; .02 INJECTION, SOLUTION INTRAVENOUS at 15:10

## 2025-06-29 RX ADMIN — BUTORPHANOL TARTRATE 1 MG: 1 INJECTION, SOLUTION INTRAMUSCULAR; INTRAVENOUS at 14:39

## 2025-06-29 RX ADMIN — SODIUM CHLORIDE, SODIUM LACTATE, POTASSIUM CHLORIDE, AND CALCIUM CHLORIDE 125 ML/HR: .6; .31; .03; .02 INJECTION, SOLUTION INTRAVENOUS at 07:03

## 2025-06-29 RX ADMIN — BUTORPHANOL TARTRATE 1 MG: 1 INJECTION, SOLUTION INTRAMUSCULAR; INTRAVENOUS at 09:27

## 2025-06-29 RX ADMIN — ACETAMINOPHEN 1000 MG: 10 INJECTION, SOLUTION INTRAVENOUS at 05:22

## 2025-06-29 RX ADMIN — ROPIVACAINE HYDROCHLORIDE: 2 INJECTION, SOLUTION EPIDURAL; INFILTRATION at 16:33

## 2025-06-29 RX ADMIN — TRANEXAMIC ACID 1000 MG: 10 INJECTION, SOLUTION INTRAVENOUS at 22:06

## 2025-06-29 RX ADMIN — SODIUM CHLORIDE, SODIUM LACTATE, POTASSIUM CHLORIDE, AND CALCIUM CHLORIDE 1000 ML: .6; .31; .03; .02 INJECTION, SOLUTION INTRAVENOUS at 05:21

## 2025-06-29 RX ADMIN — LIDOCAINE HYDROCHLORIDE AND EPINEPHRINE 2 ML: 15; 5 INJECTION, SOLUTION EPIDURAL at 16:36

## 2025-06-29 NOTE — QUICK NOTE
Presented to patient bedside to discuss plan of care. Patient would like as little intervention as possible, okay with AROM but not interested in pitocin at this time. We discussed slow cervical change and okay to continue expectant management at this time as fetus is category 1. If cervical dilation does stall, will readdress AROM vs pitocin pending contraction pattern and exam. Patient agreeable with plan.

## 2025-06-29 NOTE — OB LABOR/OXYTOCIN SAFETY PROGRESS
Oxytocin Safety Progress Check Note - Christian Ellis 30 y.o. female MRN: [id number]    Unit/Bed#: -01 Encounter: [id number]    Dose (akila-units/min) Oxytocin: 6 akila-units/min  Contraction Frequency (minutes): 4-5  Contraction Intensity: Mild/Moderate  Uterine Activity Characteristics: Irregular  Cervical Dilation: 6        Cervical Effacement: 90  Fetal Station: -1  Baseline Rate (FHR): 125 bpm  Fetal Heart Rate (FHT): 122 BPM  FHR Category: 1       Vital Signs:   Vitals:    06/29/25 1845   BP: 115/76   Pulse:    Resp:    Temp:    SpO2:      SVE as above. Bulging bag noted. Pitocin recently started at 1736. Will plan to titrate pitocin for about 3 hours before performing amniotomy. Patient aware and agreeable. FHT cat 1, moderate variability and accels.     Erica Horne MD 6/29/2025 7:01 PM

## 2025-06-29 NOTE — OB LABOR/OXYTOCIN SAFETY PROGRESS
Labor Progress Note - Christian Ellis 30 y.o. female MRN: [id number]    Unit/Bed#: -01 Encounter: [id number]       Contraction Frequency (minutes): 3-4  Contraction Intensity: Mild/Moderate  Uterine Activity Characteristics: Irregular  Cervical Dilation: 5        Cervical Effacement: 80  Fetal Station: -1  Baseline Rate (FHR): 150 bpm  Fetal Heart Rate (FHT): 141 BPM  FHR Category: I               Vital Signs:   Vitals:    06/29/25 0927   BP: 108/73   Pulse: 97   Resp:    Temp:    SpO2:        Notes/comments:   SVE as above. Unchanged from prior. Will plan to start pitocin. FHT Cat I. D/w Dr. Galvez.      Yolanda Lopez MD 6/29/2025 1:47 PM

## 2025-06-29 NOTE — OB LABOR/OXYTOCIN SAFETY PROGRESS
Labor Progress Note - Christian Ellis 30 y.o. female MRN: [id number]    Unit/Bed#: -01 Encounter: [id number]       Contraction Frequency (minutes): 2-4  Contraction Intensity: Moderate  Uterine Activity Characteristics: Irregular  Cervical Dilation: 5        Cervical Effacement: 90  Fetal Station: -1  Baseline Rate (FHR):  (hard to trace due to maternal position)  Fetal Heart Rate (FHT): 129 BPM  FHR Category: I               Vital Signs:   Vitals:    06/29/25 0829   BP: 118/91   Pulse: 91   Resp: 20   Temp: 98.6 °F (37 °C)   SpO2: 100%       Notes/comments:   SVE as above, unchanged from prior. Discussed augmentation with pitocin, she declines at this time. Will continue expectant management. Dr. Galvez at bedside.      Yolanda Lopez MD 6/29/2025 9:21 AM

## 2025-06-29 NOTE — ANESTHESIA PREPROCEDURE EVALUATION
Procedure:  LABOR ANALGESIA    Relevant Problems   CARDIO   (+) Varicose veins during pregnancy      GYN   (+) 40 weeks gestation of pregnancy   (+) Pregnancy resulting from assisted reproductive technology        Physical Exam    Airway     Mallampati score: II  TM Distance: >3 FB  Neck ROM: full      Cardiovascular      Dental       Pulmonary      Neurological      Other Findings  post-pubertal.      Anesthesia Plan  ASA Score- 2     Anesthesia Type- epidural with ASA Monitors.         Additional Monitors:     Airway Plan: natural airway.    Comment: Patient examined, history reviewed.  Labor epidural explained, risks and benefits discussed.  Consent has been signed..       Plan Factors-Exercise tolerance (METS): >4 METS.    Chart reviewed.   Existing labs reviewed. Patient summary reviewed.                  Induction-     Postoperative Plan- .   Monitoring Plan - Monitoring plan - standard ASA monitoring  Post Operative Pain Plan - epidural catheter    Perioperative Resuscitation Plan - Level 1 - Full Code.       Informed Consent- Anesthetic plan and risks discussed with patient.  I personally reviewed this patient with the CRNA. Discussed and agreed on the Anesthesia Plan with the CRNA..      NPO Status:  Vitals Value Taken Time   Date of last liquid 06/29/25 06/29/25 08:29   Time of last liquid 0700 06/29/25 08:29   Date of last solid 06/29/25 06/29/25 08:29   Time of last solid 1900 06/29/25 08:29

## 2025-06-29 NOTE — TELEPHONE ENCOUNTER
"Regardinw pregnant / mucus plug / contractions 5-6 mins part  ----- Message from Erica BARNES sent at 2025 11:29 PM EDT -----  \"I'm pregnant and my mucus plug fell out today and I've been having some contractions 5-6 minutes apart\"    "

## 2025-06-29 NOTE — ANESTHESIA PROCEDURE NOTES
Epidural Block    Patient location during procedure: OB/L&D  Start time: 6/29/2025 4:33 PM  Reason for block: procedure for pain  Staffing  Performed by: Melonie Prado CRNA  Authorized by: Melonie Prado CRNA    Preanesthetic Checklist  Completed: patient identified, IV checked, site marked, risks and benefits discussed, surgical consent, monitors and equipment checked, pre-op evaluation and timeout performed  Epidural  Patient position: sitting  Prep: ChloraPrep  Sedation Level: no sedation  Patient monitoring: continuous pulse oximetry, frequent blood pressure checks and heart rate  Approach: midline  Location: lumbar, L3-4  Injection technique: DAMION air  Needle  Needle type: Tuohy   Needle gauge: 17 G  Needle insertion depth: 6 cm  Catheter type: spring wound  Catheter at skin depth: 15 cm  Catheter securement method: clear occlusive dressing and tape  Test dose: negativelidocaine-epinephrine (XYLOCAINE-MPF/EPINEPHRINE) 1.5 %-1:200,000 injection 3 mL - Epidural   3 mL - 6/29/2025 4:34:00 PM   2 mL - 6/29/2025 4:36:00 PM  Assessment  Sensory level: T10  Number of attempts: 1no paresthesia on injection, negative aspiration for heme and negative aspiration for CSF  patient tolerated the procedure well with no immediate complications  Additional Notes  VSS, pt more comfortable post procedure. Site clean & dry

## 2025-06-29 NOTE — PROGRESS NOTES
Progress Note - OB/GYN   Name: Christian Ellis 30 y.o. female I MRN: [id number]  Unit/Bed#:  TRIAGE  I Date of Admission: 2025   Date of Service: 2025 I Hospital Day: 0     Assessment & Plan  40 weeks gestation of pregnancy      ASSESSMENT:  Christian Ellis is a 30 y.o.  at 40w2d who was evaluated today in triage for contractions. SVE 4/50/-3. NST reactive with irregular contractions on tocometer. The patient does not appear to be in labor, however, she requests another recheck since she lives >40 min away and feels that her contractions are getting stronger. She will be rechecked in 2hours and disposition will be determined per day team    PLAN:    # Contractions  - SVE: 4 / 50% / -3  - [place]ender: Irregular  - Will perform recheck     Shahida Swain MD  OB/GYN PGY-1  2025 4:44 AM    SUBJECTIVE:    Christian Ellis 30 y.o.  at 40w2d with an Estimated Date of Delivery: 25 presenting with abdominal pain. Patient denies vaginal bleeding. Patient endorses [place] contractions. Patient does not have headache, blurry vision, epigastric pain, or edema. Fetal movement is normal.     ROS  General: No fevers, chills or night sweats  Cardiovascular: No chest pain, or wheezing  HEENT: No visual changes  Pulmonary: No cough,  GI: No diarrhea, no nausea, no blood in stools or black stools  : No dysuria or hematuria  Lower Extremity: Edema wnl for pregnancy    OBJECTIVE  Vitals:   Vitals:    25 0140   BP: 112/71   Pulse: 90   Resp: 20   Temp: 98.1 °F (36.7 °C)     Body mass index is 27.07 kg/m².  GBS: Negative  Blood type: A  Estimated Date of Delivery: 25    General Physical Exam:  General: Well appearing, no acute distress  Cardiovascular: normal  Lungs: non-labored breathing  Abdomen: Soft, Non-tender, Gravid  Lower extremities: Edema wnl for pregnancy    Speculum: Normal vaginal epithelium, normal physiologic discharge, no yeast like discharge, no odor  SVE: 4 / 50% / -3    Fetal  monitoring:  Fetal heart rate: Baseline Rate (FHR): 125 bpm  Variability: Moderate  Accelerations: 15 x 15 or greater  Decelerations: Variable, Ismael not <80 bpm, For < 60 seconds  [place]: Contraction Frequency (minutes): 2-5  Contraction Duration (seconds):   Contraction Intensity: Mild/Moderate     Shahida Swain MD  6/29/2025  4:44 AM

## 2025-06-29 NOTE — PLAN OF CARE
Problem: PAIN - ADULT  Goal: Verbalizes/displays adequate comfort level or baseline comfort level  Description: Interventions:  - Encourage patient to monitor pain and request assistance  - Assess pain using appropriate pain scale  - Administer analgesics as ordered based on type and severity of pain and evaluate response  - Implement non-pharmacological measures as appropriate and evaluate response  - Consider cultural and social influences on pain and pain management  - Notify physician/advanced practitioner if interventions unsuccessful or patient reports new pain  - Educate patient/family on pain management process including their role and importance of  reporting pain   - Provide non-pharmacologic/complimentary pain relief interventions  Outcome: Progressing     Problem: INFECTION - ADULT  Goal: Absence or prevention of progression during hospitalization  Description: INTERVENTIONS:  - Assess and monitor for signs and symptoms of infection  - Monitor lab/diagnostic results  - Monitor all insertion sites, i.e. indwelling lines, tubes, and drains  - Monitor endotracheal if appropriate and nasal secretions for changes in amount and color  - [place] appropriate cooling/warming therapies per order  - Administer medications as ordered  - Instruct and encourage patient and family to use good hand hygiene technique  - Identify and instruct in appropriate isolation precautions for identified infection/condition  Outcome: Progressing  Goal: Absence of fever/infection during neutropenic period  Description: INTERVENTIONS:  - Monitor WBC  - Perform strict hand hygiene  - Limit to healthy visitors only  - No plants, dried, fresh or silk flowers with emery in patient room  Outcome: Progressing     Problem: SAFETY ADULT  Goal: Patient will remain free of falls  Description: INTERVENTIONS:  - Educate patient/family on patient safety including physical limitations  - Instruct patient to call for assistance with activity   -  Consider consulting OT/PT to assist with strengthening/mobility based on AM PAC & JH-HLM score  - Consult OT/PT to assist with strengthening/mobility   - Keep Call bell within reach  - Keep bed low and locked with side rails adjusted as appropriate  - Keep care items and personal belongings within reach  - Initiate and maintain comfort rounds  - Make Fall Risk Sign visible to staff  - Apply yellow socks and bracelet for high fall risk patients  - Consider moving patient to room near nurses station  Outcome: Progressing  Goal: Maintain or return to baseline ADL function  Description: INTERVENTIONS:  -  Assess patient's ability to carry out ADLs; assess patient's baseline for ADL function and identify physical deficits which impact ability to perform ADLs (bathing, care of mouth/teeth, toileting, grooming, dressing, etc.)  - Assess/evaluate cause of self-care deficits   - Assess range of motion  - Assess patient's mobility; develop plan if impaired  - Assess patient's need for assistive devices and provide as appropriate  - Encourage maximum independence but intervene and supervise when necessary  - Involve family in performance of ADLs  - Assess for home care needs following discharge   - Consider OT consult to assist with ADL evaluation and planning for discharge  - Provide patient education as appropriate  - Monitor functional capacity and physical performance, use of AM PAC & JH-HLM   - Monitor gait, balance and fatigue with ambulation    Outcome: Progressing  Goal: Maintains/Returns to pre admission functional level  Description: INTERVENTIONS:  - Perform AM-PAC 6 Click Basic Mobility/ Daily Activity assessment daily.  - Set and communicate daily mobility goal to care team and patient/family/caregiver.   - Collaborate with rehabilitation services on mobility goals if consulted  - Out of bed for toileting  - Record patient progress and toleration of activity level   Outcome: Progressing     Problem: DISCHARGE  PLANNING  Goal: Discharge to home or other facility with appropriate resources  Description: INTERVENTIONS:  - Identify barriers to discharge w/patient and caregiver  - Arrange for needed discharge resources and transportation as appropriate  - Identify discharge learning needs (meds, wound care, etc.)  - Arrange for interpretive services to assist at discharge as needed  - Refer to Case Management Department for coordinating discharge planning if the patient needs post-hospital services based on physician/advanced practitioner order or complex needs related to functional status, cognitive ability, or social support system  Outcome: Progressing     Problem: Knowledge Deficit  Goal: Patient/family/caregiver demonstrates understanding of disease process, treatment plan, medications, and discharge instructions  Description: Complete learning assessment and assess knowledge base.  Interventions:  - Provide teaching at level of understanding  - Provide teaching via preferred learning methods  Outcome: Progressing  Goal: Verbalizes understanding of labor plan  Description: Assess patient/family/caregiver's baseline knowledge level and ability to understand information.  Provide education via patient/family/caregiver's preferred learning method at appropriate level of understanding.     1. Provide teaching at level of understanding.  2. Provide teaching via preferred learning method(s).  Outcome: Progressing     Problem: Labor & Delivery  Goal: Manages discomfort  Description: Assess and monitor for signs and symptoms of discomfort.  Assess patient's pain level regularly and per hospital policy.  Administer medications as ordered. Support use of nonpharmacological methods to help control pain such as distraction, imagery, relaxation, and application of heat and cold.  Collaborate with interdisciplinary team and patient to determine appropriate pain management plan.    1. Include patient in decisions related to comfort.  2.  Offer non-pharmacological pain management interventions.  3. Report ineffective pain management to physician.  Outcome: Progressing  Goal: Patient vital signs are stable  Description: 1. Assess vital signs - vaginal delivery.  Outcome: Progressing     Problem: BIRTH - VAGINAL/ SECTION  Goal: Fetal and maternal status remain reassuring during the birth process  Description: INTERVENTIONS:  - Monitor vital signs  - Monitor fetal heart rate  - Monitor uterine activity  - Monitor labor progression (vaginal delivery)  - DVT prophylaxis  - Antibiotic prophylaxis  Outcome: Progressing  Goal: Emotionally satisfying birthing experience for mother/fetus  Description: Interventions:  - Assess, plan, implement and evaluate the nursing care given to the patient in labor  - Advocate the philosophy that each childbirth experience is a unique experience and support the family's chosen level of involvement and control during the labor process   - Actively participate in both the patient's and family's teaching of the birth process  - Consider cultural, Gnosticist and age-specific factors and plan care for the patient in labor  Outcome: Progressing

## 2025-06-29 NOTE — PLAN OF CARE
Problem: PAIN - ADULT  Goal: Verbalizes/displays adequate comfort level or baseline comfort level  Description: Interventions:  - Encourage patient to monitor pain and request assistance  - Assess pain using appropriate pain scale  - Administer analgesics as ordered based on type and severity of pain and evaluate response  - Implement non-pharmacological measures as appropriate and evaluate response  - Consider cultural and social influences on pain and pain management  - Notify physician/advanced practitioner if interventions unsuccessful or patient reports new pain  - Educate patient/family on pain management process including their role and importance of  reporting pain   - Provide non-pharmacologic/complimentary pain relief interventions  6/29/2025 0859 by Elizabeth Padgett RN  Outcome: Progressing  6/29/2025 0858 by Elizabeth Padgett RN  Outcome: Progressing     Problem: INFECTION - ADULT  Goal: Absence or prevention of progression during hospitalization  Description: INTERVENTIONS:  - Assess and monitor for signs and symptoms of infection  - Monitor lab/diagnostic results  - Monitor all insertion sites, i.e. indwelling lines, tubes, and drains  - Monitor endotracheal if appropriate and nasal secretions for changes in amount and color  - [place] appropriate cooling/warming therapies per order  - Administer medications as ordered  - Instruct and encourage patient and family to use good hand hygiene technique  - Identify and instruct in appropriate isolation precautions for identified infection/condition  6/29/2025 0859 by Elizabeth Padgett RN  Outcome: Progressing  6/29/2025 0858 by Elizabeth Padgett RN  Outcome: Progressing  Goal: Absence of fever/infection during neutropenic period  Description: INTERVENTIONS:  - Monitor WBC  - Perform strict hand hygiene  - Limit to healthy visitors only  - No plants, dried, fresh or silk flowers with emery in patient room  6/29/2025 0859 by  Elizabeth Padgett RN  Outcome: Progressing  6/29/2025 0858 by Elizabeth Padgett RN  Outcome: Progressing     Problem: SAFETY ADULT  Goal: Patient will remain free of falls  Description: INTERVENTIONS:  - Educate patient/family on patient safety including physical limitations  - Instruct patient to call for assistance with activity   - Consider consulting OT/PT to assist with strengthening/mobility based on AM PAC & JH-HLM score  - Consult OT/PT to assist with strengthening/mobility   - Keep Call bell within reach  - Keep bed low and locked with side rails adjusted as appropriate  - Keep care items and personal belongings within reach  - Initiate and maintain comfort rounds  - Make Fall Risk Sign visible to staff  - Offer Toileting every  Hours, in advance of need  - Initiate/Maintain alarm  - Obtain necessary fall risk management equipment:   - Apply yellow socks and bracelet for high fall risk patients  - Consider moving patient to room near nurses station  6/29/2025 0859 by Elizabeth Padgett RN  Outcome: Progressing  6/29/2025 0858 by Elizabeth Padgett RN  Outcome: Progressing  Goal: Maintain or return to baseline ADL function  Description: INTERVENTIONS:  -  Assess patient's ability to carry out ADLs; assess patient's baseline for ADL function and identify physical deficits which impact ability to perform ADLs (bathing, care of mouth/teeth, toileting, grooming, dressing, etc.)  - Assess/evaluate cause of self-care deficits   - Assess range of motion  - Assess patient's mobility; develop plan if impaired  - Assess patient's need for assistive devices and provide as appropriate  - Encourage maximum independence but intervene and supervise when necessary  - Involve family in performance of ADLs  - Assess for home care needs following discharge   - Consider OT consult to assist with ADL evaluation and planning for discharge  - Provide patient education as appropriate  - Monitor functional  capacity and physical performance, use of AM PAC & -HLM   - Monitor gait, balance and fatigue with ambulation    6/29/2025 0859 by Elizabeth Padgett RN  Outcome: Progressing  6/29/2025 0858 by Elizabeth Padgett RN  Outcome: Progressing  Goal: Maintains/Returns to pre admission functional level  Description: INTERVENTIONS:  - Perform AM-PAC 6 Click Basic Mobility/ Daily Activity assessment daily.  - Set and communicate daily mobility goal to care team and patient/family/caregiver.   - Collaborate with rehabilitation services on mobility goals if consulted  - Perform Range of Motion  times a day.  - Reposition patient every  hours.  - Dangle patient  times a day  - Stand patient  times a day  - Ambulate patient  times a day  - Out of bed to chair  times a day   - Out of bed for meals  times a day  - Out of bed for toileting  - Record patient progress and toleration of activity level   6/29/2025 0859 by Elizabeth Padgett RN  Outcome: Progressing  6/29/2025 0858 by Elizabeth Padgett RN  Outcome: Progressing     Problem: DISCHARGE PLANNING  Goal: Discharge to home or other facility with appropriate resources  Description: INTERVENTIONS:  - Identify barriers to discharge w/patient and caregiver  - Arrange for needed discharge resources and transportation as appropriate  - Identify discharge learning needs (meds, wound care, etc.)  - Arrange for interpretive services to assist at discharge as needed  - Refer to Case Management Department for coordinating discharge planning if the patient needs post-hospital services based on physician/advanced practitioner order or complex needs related to functional status, cognitive ability, or social support system  6/29/2025 0859 by Elizabeth Padgett RN  Outcome: Progressing  6/29/2025 0858 by Elizabeth Padgett RN  Outcome: Progressing     Problem: Knowledge Deficit  Goal: Patient/family/caregiver demonstrates understanding of disease  process, treatment plan, medications, and discharge instructions  Description: Complete learning assessment and assess knowledge base.  Interventions:  - Provide teaching at level of understanding  - Provide teaching via preferred learning methods  2025 by Elizabeth Padgett RN  Outcome: Progressing  2025 by Elizabeth Padgett RN  Outcome: Progressing  Goal: Verbalizes understanding of labor plan  Description: Assess patient/family/caregiver's baseline knowledge level and ability to understand information.  Provide education via patient/family/caregiver's preferred learning method at appropriate level of understanding.     1. Provide teaching at level of understanding.  2. Provide teaching via preferred learning method(s).  2025 by Elizabeth Padgett RN  Outcome: Progressing  2025 by Elizabeth Padgett RN  Outcome: Progressing     Problem: Labor & Delivery  Goal: Manages discomfort  Description: Assess and monitor for signs and symptoms of discomfort.  Assess patient's pain level regularly and per hospital policy.  Administer medications as ordered. Support use of nonpharmacological methods to help control pain such as distraction, imagery, relaxation, and application of heat and cold.  Collaborate with interdisciplinary team and patient to determine appropriate pain management plan.    1. Include patient in decisions related to comfort.  2. Offer non-pharmacological pain management interventions.  3. Report ineffective pain management to physician.  2025 by Elizabeth Padgett RN  Outcome: Progressing  2025 by Elizabeth Padgett RN  Outcome: Progressing  Goal: Patient vital signs are stable  Description: 1. Assess vital signs - vaginal delivery.  2025 by Elizabeth Padgett RN  Outcome: Progressing  2025 by Elizabeth Padgett RN  Outcome: Progressing     Problem: BIRTH - VAGINAL/  SECTION  Goal: Fetal and maternal status remain reassuring during the birth process  Description: INTERVENTIONS:  - Monitor vital signs  - Monitor fetal heart rate  - Monitor uterine activity  - Monitor labor progression (vaginal delivery)  - DVT prophylaxis  - Antibiotic prophylaxis  6/29/2025 0859 by Elizabeth Padgett RN  Outcome: Progressing  6/29/2025 0858 by Elizabeth Padgett RN  Outcome: Progressing  Goal: Emotionally satisfying birthing experience for mother/fetus  Description: Interventions:  - Assess, plan, implement and evaluate the nursing care given to the patient in labor  - Advocate the philosophy that each childbirth experience is a unique experience and support the family's chosen level of involvement and control during the labor process   - Actively participate in both the patient's and family's teaching of the birth process  - Consider cultural, Synagogue and age-specific factors and plan care for the patient in labor  6/29/2025 0859 by Elizabeth Padgett RN  Outcome: Progressing  6/29/2025 0858 by Elizabeth Padgett RN  Outcome: Progressing

## 2025-06-29 NOTE — TELEPHONE ENCOUNTER
"REASON FOR CONVERSATION: Contractions    SYMPTOMS: Lost mucous plug this morning; contractions every 5-6 minutes for the past two hours, lasting about one minute each. Feels baby move; no leakage of fluid or vaginal bleeding    OTHER HEALTH INFORMATION: None    PROTOCOL DISPOSITION: Go to  Now    CARE ADVICE PROVIDED: Please come to Labor and Delivery for evaluation. Enter through the Emergency Department due to after hours.    PRACTICE FOLLOW-UP: None      Reason for Disposition   [1] First baby (primipara) AND [2] contractions < 6 minutes apart  AND [3] present 2 hours    Answer Assessment - Initial Assessment Questions  1. ONSET: \"When did the symptoms begin?\"           0900    2. CONTRACTIONS: \"Describe the contractions that you are having.\" (e.g., duration, frequency, regularity, severity)        Intermittent all day - every 5-6 minutes for the past two hours    3. PREGNANCY: \"How many weeks pregnant are you?\"        40     4. WINSTON: \"What date are you expecting to deliver?\"        25    5. PARITY: \"Have you had a baby before?\" If Yes, ask: \"How long did the labor last?\"            6. FETAL MOVEMENT: \"Has the baby's movement decreased or changed significantly from normal?\"        She has been moving all day    7. OTHER SYMPTOMS: \"Do you have any other symptoms?\" (e.g., leaking fluid from vagina, vaginal bleeding, fever, hand/facial swelling)        Lost mucous plug this morning; denies LOF, vaginal bleeding.    Protocols used: Pregnancy - Labor-Adult-    "

## 2025-06-29 NOTE — H&P
H & P- Obstetrics   Christian Ellis 30 y.o. female MRN: [id number]  Unit/Bed#: LD TRIAGE 2- Encounter: [id number]    Assessment: 30 y.o.  at 40w2d admitted for labor .  SVE:   Clinical EFW: 7lbs by leopolds ; Cephalic confirmed by ultrasound  GBS status: negative     By Problem:   Assessment & Plan  40 weeks gestation of pregnancy  Admit to OBN   Clear liquid diet   F/u T&S, CBC, RPR   IVF LR 125cc/hr   Continuous fetal monitoring and tocometry   Analgesia at maternal request   Vertex by TAUS  Expectant management          Discussed case and plan w/ Dr. Galvez      Chief Complaint: contractions    HPI: Christian Ellis is a 30 y.o.  with an WINSTON of 2025, by Est. Date of Conception at 40w2d who is being admitted for labor . She complains of uterine contractions, occurring every 5 minutes, has no LOF, and reports no VB. She states she has felt good FM.    Problem List[1]    Baby complications/comments: none    Review of Systems   Constitutional:  Negative for chills and fever.   HENT:  Negative for ear pain and sore throat.    Eyes:  Negative for pain and visual disturbance.   Respiratory:  Negative for cough and shortness of breath.    Cardiovascular:  Negative for chest pain and palpitations.   Gastrointestinal:  Negative for abdominal pain and vomiting.   Genitourinary:  Negative for dysuria and hematuria.   Musculoskeletal:  Negative for arthralgias and back pain.   Skin:  Negative for color change and rash.   Neurological:  Negative for seizures and syncope.   All other systems reviewed and are negative.      OB Hx:  OB History    Para Term  AB Living   3    2 0   SAB IAB Ectopic Multiple Live Births   1 1         # Outcome Date GA Lbr Memo/2nd Weight Sex Type Anes PTL Lv   3 Current            2 SAB 07/10/24 9w0d          1 IAB                Past Medical Hx:  Past Medical History[2]    Past Surgical hx:  Past Surgical History[3]    Social Hx:  Alcohol use: denies  Tobacco  use: denies  Other substance use: denies      No Known Allergies      Medications Prior to Admission:     Omega 3-6-9 Fatty Acids (OMEGA DHA PO)    Prenatal Vit-DSS-Fe Cbn-FA (PRENATAL AD PO)    Objective:  Temp:  [98.1 °F (36.7 °C)] 98.1 °F (36.7 °C)  HR:  [90] 90  BP: (112)/(71) 112/71  Resp:  [20] 20  Body mass index is 27.07 kg/m².     Physical Exam:  Physical Exam  Constitutional:       Appearance: Normal appearance.     Cardiovascular:      Rate and Rhythm: Normal rate and regular rhythm.   Pulmonary:      Effort: Pulmonary effort is normal. No respiratory distress.   Abdominal:      Palpations: Abdomen is soft.      Tenderness: There is no abdominal tenderness.      Comments: Gravid     Musculoskeletal:         General: No swelling or tenderness.     Neurological:      General: No focal deficit present.      Mental Status: She is alert and oriented to person, place, and time.     Skin:     General: Skin is warm and dry.   Vitals reviewed.            FHT:  Baseline Rate (FHR): 125 bpm  Variability: Moderate  Accelerations: 15 x 15 or greater  Decelerations: Variable, Ismael not <80 bpm, For < 60 seconds  FHR Category: Category II    TOCO:   Contraction Frequency (minutes): 1.5-4  Contraction Duration (seconds): 60-90  Contraction Intensity: Moderate    Lab Results   Component Value Date    WBC 11.62 (H) 04/17/2025    HGB 11.8 04/17/2025    HCT 34.8 04/17/2025     04/17/2025     Lab Results   Component Value Date    K 4.1 03/21/2024     03/21/2024    CO2 28 03/21/2024    BUN 10 03/21/2024    CREATININE 0.75 03/21/2024    AST 13 03/21/2024    ALT 8 03/21/2024     Prenatal Labs: Reviewed      Blood type: A+  Antibody: Negative  GBS: Negative  HIV: Nonreactive  Rubella: Immune  Syphilis IgM/IgG: Nonreactive  HBsAg: Nonreactive  HCAb: Nonreactive  Chlamydia: Negative  Gonorrhea: Good if  Diabetes 1 hour screen: 112  3 hour glucose: Not indicated  Platelets: 277  Hgb: 13.8  >2 Midnights  INPATIENT      Signature/Title: Bryanna eRy MD  Date: 6/29/2025  Time: 6:51 AM          [1]   Patient Active Problem List  Diagnosis    Subchorionic hematoma in first trimester    40 weeks gestation of pregnancy    History of PCOS    Pregnancy resulting from assisted reproductive technology    Varicose veins during pregnancy   [2]   Past Medical History:  Diagnosis Date    Polycystic ovary syndrome     Varicella    [3]   Past Surgical History:  Procedure Laterality Date    DILATION AND CURETTAGE OF UTERUS  2024    TOOTH EXTRACTION

## 2025-06-30 PROCEDURE — 99024 POSTOP FOLLOW-UP VISIT: CPT | Performed by: STUDENT IN AN ORGANIZED HEALTH CARE EDUCATION/TRAINING PROGRAM

## 2025-06-30 RX ADMIN — DOCUSATE SODIUM 100 MG: 100 CAPSULE, LIQUID FILLED ORAL at 09:12

## 2025-06-30 RX ADMIN — ACETAMINOPHEN 975 MG: 325 TABLET ORAL at 00:23

## 2025-06-30 RX ADMIN — IBUPROFEN 600 MG: 600 TABLET, FILM COATED ORAL at 00:23

## 2025-06-30 RX ADMIN — ACETAMINOPHEN 975 MG: 325 TABLET ORAL at 06:05

## 2025-06-30 RX ADMIN — WITCH HAZEL 1 PAD: 500 SOLUTION RECTAL; TOPICAL at 00:24

## 2025-06-30 RX ADMIN — HYDROCORTISONE 1 APPLICATION: 1 CREAM TOPICAL at 00:24

## 2025-06-30 RX ADMIN — IBUPROFEN 600 MG: 600 TABLET, FILM COATED ORAL at 06:05

## 2025-06-30 RX ADMIN — DOCUSATE SODIUM 100 MG: 100 CAPSULE, LIQUID FILLED ORAL at 18:53

## 2025-06-30 RX ADMIN — BENZOCAINE AND LEVOMENTHOL 1 APPLICATION: 200; 5 SPRAY TOPICAL at 00:24

## 2025-06-30 RX ADMIN — ACETAMINOPHEN 975 MG: 325 TABLET ORAL at 12:51

## 2025-06-30 RX ADMIN — IBUPROFEN 600 MG: 600 TABLET, FILM COATED ORAL at 12:52

## 2025-06-30 RX ADMIN — ACETAMINOPHEN 975 MG: 325 TABLET ORAL at 18:51

## 2025-06-30 NOTE — ASSESSMENT & PLAN NOTE
Continue routine post partum care  Encourage ambulation  Encourage breastfeeding  Anticipate discharge PPD 2 due to late delivery

## 2025-06-30 NOTE — PROGRESS NOTES
Progress Note - OB/GYN   Name: Christian Ellis 30 y.o. female I MRN: [id number]  Unit/Bed#: -01 I Date of Admission: 2025   Date of Service: 2025 I Hospital Day: 1     Assessment & Plan   (spontaneous vaginal delivery)  Continue routine post partum care  Encourage ambulation  Encourage breastfeeding  Anticipate discharge PPD 2 due to late delivery      OB Post-Partum Progress Note  Subjective   Post delivery. PPD 1. Patient is doing well. Lochia WNL. Pain well controlled.     Pain: yes, cramping, improved with meds  Tolerating PO: yes  Voiding: yes  Flatus: yes  Ambulating: yes  Breastfeeding:  yes  Chest pain: no  Shortness of breath: no  Leg pain: no  Lochia: WNL    Objective :  Temp:  [97.8 °F (36.6 °C)-98.6 °F (37 °C)] 98.3 °F (36.8 °C)  HR:  [] 100  BP: ()/(51-91) 106/59  Resp:  [18-20] 18  SpO2:  [88 %-100 %] 98 %  O2 Device: None (Room air)    Physical Exam  Vitals reviewed.   Constitutional:       General: She is not in acute distress.     Appearance: Normal appearance.   HENT:      Head: Normocephalic and atraumatic.     Eyes:      Extraocular Movements: Extraocular movements intact.       Cardiovascular:      Rate and Rhythm: Normal rate.   Pulmonary:      Effort: Pulmonary effort is normal. No respiratory distress.   Abdominal:      Comments: Deferred, breastfeeding       Skin:     General: Skin is warm and dry.     Neurological:      Mental Status: She is alert.     Psychiatric:         Mood and Affect: Mood normal.         Behavior: Behavior normal.           Lab Results: I have reviewed the following results:  Lab Results   Component Value Date    WBC 17.08 (H) 2025    HGB 13.8 2025    HCT 38.8 2025    MCV 93 2025     2025

## 2025-06-30 NOTE — PLAN OF CARE
Problem: PAIN - ADULT  Goal: Verbalizes/displays adequate comfort level or baseline comfort level  Description: Interventions:  - Encourage patient to monitor pain and request assistance  - Assess pain using appropriate pain scale  - Administer analgesics as ordered based on type and severity of pain and evaluate response  - Implement non-pharmacological measures as appropriate and evaluate response  - Consider cultural and social influences on pain and pain management  - Notify physician/advanced practitioner if interventions unsuccessful or patient reports new pain  - Educate patient/family on pain management process including their role and importance of  reporting pain   - Provide non-pharmacologic/complimentary pain relief interventions  Outcome: Progressing     Problem: INFECTION - ADULT  Goal: Absence or prevention of progression during hospitalization  Description: INTERVENTIONS:  - Assess and monitor for signs and symptoms of infection  - Monitor lab/diagnostic results  - Monitor all insertion sites, i.e. indwelling lines, tubes, and drains  - Monitor endotracheal if appropriate and nasal secretions for changes in amount and color  - [place] appropriate cooling/warming therapies per order  - Administer medications as ordered  - Instruct and encourage patient and family to use good hand hygiene technique  - Identify and instruct in appropriate isolation precautions for identified infection/condition  Outcome: Progressing  Goal: Absence of fever/infection during neutropenic period  Description: INTERVENTIONS:  - Monitor WBC  - Perform strict hand hygiene  - Limit to healthy visitors only  - No plants, dried, fresh or silk flowers with emery in patient room  Outcome: Progressing     Problem: SAFETY ADULT  Goal: Patient will remain free of falls  Description: INTERVENTIONS:  - Educate patient/family on patient safety including physical limitations  - Instruct patient to call for assistance with activity   -  Consider consulting OT/PT to assist with strengthening/mobility based on AM PAC & JH-HLM score  - Consult OT/PT to assist with strengthening/mobility   - Keep Call bell within reach  - Keep bed low and locked with side rails adjusted as appropriate  - Keep care items and personal belongings within reach  - Initiate and maintain comfort rounds  - Make Fall Risk Sign visible to staff  - Offer Toileting every 2-3 Hours, in advance of need      - Apply yellow socks and bracelet for high fall risk patients  - Consider moving patient to room near nurses station  Outcome: Progressing  Goal: Maintain or return to baseline ADL function  Description: INTERVENTIONS:  -  Assess patient's ability to carry out ADLs; assess patient's baseline for ADL function and identify physical deficits which impact ability to perform ADLs (bathing, care of mouth/teeth, toileting, grooming, dressing, etc.)  - Assess/evaluate cause of self-care deficits   - Assess range of motion  - Assess patient's mobility; develop plan if impaired  - Assess patient's need for assistive devices and provide as appropriate  - Encourage maximum independence but intervene and supervise when necessary  - Involve family in performance of ADLs  - Assess for home care needs following discharge   - Consider OT consult to assist with ADL evaluation and planning for discharge  - Provide patient education as appropriate  - Monitor functional capacity and physical performance, use of AM PAC & JH-HLM   - Monitor gait, balance and fatigue with ambulation    Outcome: Progressing  Goal: Maintains/Returns to pre admission functional level  Description: INTERVENTIONS:  - Perform AM-PAC 6 Click Basic Mobility/ Daily Activity assessment daily.  - Set and communicate daily mobility goal to care team and patient/family/caregiver.   - Collaborate with rehabilitation services on mobility goals if consulted  - Out of bed for toileting  - Record patient progress and toleration of  activity level   Outcome: Progressing     Problem: DISCHARGE PLANNING  Goal: Discharge to home or other facility with appropriate resources  Description: INTERVENTIONS:  - Identify barriers to discharge w/patient and caregiver  - Arrange for needed discharge resources and transportation as appropriate  - Identify discharge learning needs (meds, wound care, etc.)  - Arrange for interpretive services to assist at discharge as needed  - Refer to Case Management Department for coordinating discharge planning if the patient needs post-hospital services based on physician/advanced practitioner order or complex needs related to functional status, cognitive ability, or social support system  Outcome: Progressing     Problem: POSTPARTUM  Goal: Experiences normal postpartum course  Description: INTERVENTIONS:  - Monitor maternal vital signs  - Assess uterine involution and lochia  Outcome: Progressing  Goal: Appropriate maternal -  bonding  Description: INTERVENTIONS:  - Identify family support  - Assess for appropriate maternal/infant bonding   -Encourage maternal/infant bonding opportunities  - Referral to  or  as needed  Outcome: Progressing  Goal: Establishment of infant feeding pattern  Description: INTERVENTIONS:  - Assess breast/bottle feeding  - Refer to lactation as needed  Outcome: Progressing  Goal: Incision(s), wounds(s) or drain site(s) healing without S/S of infection  Description: INTERVENTIONS  - Assess and document dressing, incision, wound bed, drain sites and surrounding tissue    Outcome: Progressing

## 2025-06-30 NOTE — PLAN OF CARE
Problem: PAIN - ADULT  Goal: Verbalizes/displays adequate comfort level or baseline comfort level  Description: Interventions:  - Encourage patient to monitor pain and request assistance  - Assess pain using appropriate pain scale  - Administer analgesics as ordered based on type and severity of pain and evaluate response  - Implement non-pharmacological measures as appropriate and evaluate response  - Consider cultural and social influences on pain and pain management  - Notify physician/advanced practitioner if interventions unsuccessful or patient reports new pain  - Educate patient/family on pain management process including their role and importance of  reporting pain   - Provide non-pharmacologic/complimentary pain relief interventions  6/30/2025 0342 by Tayo White RN  Outcome: Progressing  6/29/2025 1919 by Tayo White RN  Outcome: Progressing     Problem: INFECTION - ADULT  Goal: Absence or prevention of progression during hospitalization  Description: INTERVENTIONS:  - Assess and monitor for signs and symptoms of infection  - Monitor lab/diagnostic results  - Monitor all insertion sites, i.e. indwelling lines, tubes, and drains  - Monitor endotracheal if appropriate and nasal secretions for changes in amount and color  - [place] appropriate cooling/warming therapies per order  - Administer medications as ordered  - Instruct and encourage patient and family to use good hand hygiene technique  - Identify and instruct in appropriate isolation precautions for identified infection/condition  6/30/2025 0342 by Tayo White RN  Outcome: Progressing  6/29/2025 1919 by Tayo White RN  Outcome: Progressing  Goal: Absence of fever/infection during neutropenic period  Description: INTERVENTIONS:  - Monitor WBC  - Perform strict hand hygiene  - Limit to healthy visitors only  - No plants, dried, fresh or silk flowers with emery in patient room  6/30/2025 0342 by Tayo White RN  Outcome:  Progressing  6/29/2025 1919 by Tayo White RN  Outcome: Progressing     Problem: SAFETY ADULT  Goal: Patient will remain free of falls  Description: INTERVENTIONS:  - Educate patient/family on patient safety including physical limitations  - Instruct patient to call for assistance with activity   - Consider consulting OT/PT to assist with strengthening/mobility based on AM PAC & JH-HLM score  - Consult OT/PT to assist with strengthening/mobility   - Keep Call bell within reach  - Keep bed low and locked with side rails adjusted as appropriate  - Keep care items and personal belongings within reach  - Initiate and maintain comfort rounds  - Make Fall Risk Sign visible to staff  - Apply yellow socks and bracelet for high fall risk patients  - Consider moving patient to room near nurses station  6/30/2025 0342 by Tayo White RN  Outcome: Progressing  6/29/2025 1919 by Tayo White RN  Outcome: Progressing  Goal: Maintain or return to baseline ADL function  Description: INTERVENTIONS:  -  Assess patient's ability to carry out ADLs; assess patient's baseline for ADL function and identify physical deficits which impact ability to perform ADLs (bathing, care of mouth/teeth, toileting, grooming, dressing, etc.)  - Assess/evaluate cause of self-care deficits   - Assess range of motion  - Assess patient's mobility; develop plan if impaired  - Assess patient's need for assistive devices and provide as appropriate  - Encourage maximum independence but intervene and supervise when necessary  - Involve family in performance of ADLs  - Assess for home care needs following discharge   - Consider OT consult to assist with ADL evaluation and planning for discharge  - Provide patient education as appropriate  - Monitor functional capacity and physical performance, use of AM PAC & JH-HLM   - Monitor gait, balance and fatigue with ambulation    6/30/2025 0342 by Tayo White RN  Outcome: Progressing  6/29/2025 1919 by Tayo  AGATHA White  Outcome: Progressing  Goal: Maintains/Returns to pre admission functional level  Description: INTERVENTIONS:  - Perform AM-PAC 6 Click Basic Mobility/ Daily Activity assessment daily.  - Set and communicate daily mobility goal to care team and patient/family/caregiver.   - Collaborate with rehabilitation services on mobility goals if consulted  - Out of bed for toileting  - Record patient progress and toleration of activity level   2025 by Tayo White RN  Outcome: Progressing  2025 by Tayo White RN  Outcome: Progressing     Problem: DISCHARGE PLANNING  Goal: Discharge to home or other facility with appropriate resources  Description: INTERVENTIONS:  - Identify barriers to discharge w/patient and caregiver  - Arrange for needed discharge resources and transportation as appropriate  - Identify discharge learning needs (meds, wound care, etc.)  - Arrange for interpretive services to assist at discharge as needed  - Refer to Case Management Department for coordinating discharge planning if the patient needs post-hospital services based on physician/advanced practitioner order or complex needs related to functional status, cognitive ability, or social support system  2025 by Tayo White RN  Outcome: Progressing  2025 by Tayo White RN  Outcome: Progressing     Problem: POSTPARTUM  Goal: Experiences normal postpartum course  Description: INTERVENTIONS:  - Monitor maternal vital signs  - Assess uterine involution and lochia  Outcome: Progressing  Goal: Appropriate maternal -  bonding  Description: INTERVENTIONS:  - Identify family support  - Assess for appropriate maternal/infant bonding   -Encourage maternal/infant bonding opportunities  - Referral to  or  as needed  Outcome: Progressing  Goal: Establishment of infant feeding pattern  Description: INTERVENTIONS:  - Assess breast/bottle feeding  - Refer to lactation as  needed  Outcome: Progressing  Goal: Incision(s), wounds(s) or drain site(s) healing without S/S of infection  Description: INTERVENTIONS  - Assess and document dressing, incision, wound bed, drain sites and surrounding tissue     Outcome: Progressing

## 2025-06-30 NOTE — DISCHARGE SUMMARY
Discharge Summary - OB/GYN   Name: Christian Ellis 30 y.o. female I MRN: [id number]  Unit/Bed#: -01 I Date of Admission: 2025   Date of Service: 2025 I Hospital Day: 2    ADMISSION  Patient of: [facility]  Admission Date: 2025   Admitting Attending: Dr. Galvez  Admitting Diagnoses: Problem List[1]    DELIVERY  Delivery Method: Vaginal, Spontaneous   Delivery Date and Time: 2025 9:48 PM  Delivery Attending: Alyce Galvez    DISCHARGE  Discharge Date: 2025  Discharge Attending: Dr. Fay  Discharge Diagnosis:   Same, Delivered    Clinical course: Admission to Delivery  Christian Ellis is a 30 y.o. [id number] 40w2d . She presented to labor and delivery for regular painful contractions. Her pregnancy was uncomplicated. On exam in triage she was noted to be 4/50/-3. She was admitted for labor. She was augmented with pitocin.     Delivery  Route of Delivery: Vaginal, Spontaneous  Anesthesia: Epidural,   QBL: Non-Surgical QBL (mL): 771       Delivery: Vaginal, Spontaneous at 2025 9:48 PM  Laceration: Perineal: 2° Repaired? Yes    Baby's Weight: 3555 g (7 lb 13.4 oz); 125.4    Apgar scores: 8  and 9  at 1 and 5 minutes, respectively    Clinical Course: Post-Delivery:  The post delivery course was unremarkable.    On the day of discharge, the patient was ambulating, voiding spontaneously, tolerating oral intake, and hemodynamically stable. She was able to reasonably perform all ADLs. She had appropriate bowel function. Pain was well-controlled. She was discharged home on postpartum day #2 without complications. Patient was instructed to follow up with her OB as an outpatient and was given appropriate warnings to call her provider with problems or concerns.    Pertinent lab findings included:   Blood type A+.     Last three Hgb values:  Lab Results   Component Value Date    HGB 13.8 2025    HGB 11.8 2025    HGB 12.4 2024        Problem-specific follow-up plans  included the following:  Assessment & Plan   (spontaneous vaginal delivery)  Continue routine post partum care  Encourage ambulation  Encourage breastfeeding  Anticipate discharge PPD 2         Discharge med list:       Medication List      START taking these medications     acetaminophen 325 mg tablet; Commonly known as: TYLENOL; Take 3 tablets   (975 mg total) by mouth every 6 (six) hours   benzocaine-menthol-lanolin-aloe 20-0.5 % topical spray; Commonly known   as: DERMOPLAST; Apply 1 Application topically every 6 (six) hours as   needed for mild pain or irritation   hydrocortisone 1 % cream; Apply 1 Application topically daily as needed   for irritation   ibuprofen 600 mg tablet; Commonly known as: MOTRIN; Take 1 tablet (600   mg total) by mouth every 6 (six) hours   witch hazel-glycerin topical pad; Commonly known as: TUCKS; Apply 1 Pad   topically every 4 (four) hours as needed for irritation     CONTINUE taking these medications     OMEGA DHA PO   PRENATAL AD PO       Condition at discharge:   good     Disposition:   Home    Planned Readmission:   No           [1]   Patient Active Problem List  Diagnosis    Subchorionic hematoma in first trimester    40 weeks gestation of pregnancy    History of PCOS    Pregnancy resulting from assisted reproductive technology    Varicose veins during pregnancy     (spontaneous vaginal delivery)

## 2025-06-30 NOTE — L&D DELIVERY NOTE
Vaginal Delivery Summary - OB/GYN   Christian Ellis 30 y.o. female MRN: [id number]  Unit/Bed#: -01 Encounter: [id number]    Pre-delivery Diagnosis:   Pregnancy at 40.2wks   IUI pregnancy    Post-delivery Diagnosis: same, delivered    Procedure: Spontaneous Vaginal Delivery     [facility]    Attending Physician: Dr. Galvez  Resident Physician: Dr. Horne    Anesthesia: Epidural,     QBL:      pending    Complications: none apparent    Specimens:   1. Arterial and venous cord gases  2. Cord blood  3. Segment of umbilical cord  4. Placenta to storage     Findings:  1. Viable female  on 2025 9:48 PM via Vaginal, Spontaneous . with APGAR scores of   and   at 1 and 5 minutes, respectively. Weight pending at time of dictation for skin to skin bonding.  2. Spontaneous delivery of intact placenta  3. 2 degree laceration repaired with 3-0 Vicryl Rapide      Gases:  Umbilical Artery  Recent Labs     25  2153   PHCART 7.257   BECART -7.5*       Umbilical Vein  Recent Labs     25  2153   PHCVEN 7.348   BECVEN -4.5*           Brief history and labor course:  Christian Ellis is a 30 y.o. [id number] 40w2d . She presented to labor and delivery for regular painful contractions. Her pregnancy was uncomplicated. On exam in triage she was noted to be 4/50/-3. She was admitted for labor. She was augmented with pitocin.     Description of delivery:    After pushing, Christian delivered a viable female , wt pending as mother is doing skin to skin bonding. The fetal vertex delivered OA position spontaneously. There was no nuchal cord. The anterior shoulder delivered atraumatically with maternal expulsive forces and the assistance of gentle downward traction. The posterior shoulder delivered with maternal expulsive forces and the assistance of gentle upward traction. The remainder of the fetus delivered spontaneously.      Upon delivery, the infant was placed on Christian's abdomen and the  cord was doubly clamped and cut. Delayed cord clamping was achieved. The infant was noted to cry spontaneously and was moving all extremities appropriately. There was no evidence for injury. Awaiting nurse resuscitators evaluated the . Arterial and venous cord blood gases and cord blood was collected for analysis. These were promptly sent to the lab. In the immediate post-partum, active management of the 3rd stage of labor was performed with massage, the administration of 30 units of IV pitocin, and gentle traction on the umbilical cord. The placenta delivered spontaneously and was noted to have a centrally inserted 3 vessel cord.  The placenta was sent to storage.     Laceration Repair  The vagina, cervix, perineum, and rectum were inspected and there was noted to be a second degree laceration.    The patient was comfortable with epidural for analgesia. The vaginal mucosa and submucosa were then re approximated using 3-0 vicryl rapide to the point of the hymenal ring. Interrupted 3-0 vicryl was used to re approximate the muscle and fascia. The superficial perineal fascia was then re approximated using 3-0 vicryl in a running non locked stitch downward followed by a running subcuticular stitch upward to the level of the introitus.    At the conclusion of the procedure, all needle, sponge, and instrument counts were noted to be correct. Dr. Galvez was present and participated in all key portions of the case.    Disposition:  The patient and the  both tolerated the procedure well and are recovering in labor and delivery room       Alyce Galvez DO  2025  10:15 PM

## 2025-06-30 NOTE — OB LABOR/OXYTOCIN SAFETY PROGRESS
Oxytocin Safety Progress Check Note - Christian Ellis 30 y.o. female MRN: [id number]    Unit/Bed#: -01 Encounter: [id number]    Dose (akila-units/min) Oxytocin: 6 akila-units/min  Contraction Frequency (minutes): 2.5-3.5  Contraction Intensity: Mild/Moderate  Uterine Activity Characteristics: Irregular  Cervical Dilation: 10  Dilation Complete Date: 06/29/25  Dilation Complete Time: 1954  Cervical Effacement: 100  Fetal Station: 0  Baseline Rate (FHR): 120 bpm  Fetal Heart Rate (FHT): 121 BPM  FHR Category: 1    Vital Signs:   Vitals:    06/29/25 2001   BP:    Pulse:    Resp:    Temp: 97.8 °F (36.6 °C)   SpO2:      Spontaneous rupture of membranes at 1941. Clear fluid. She is now complete and 0 station. Patient requesting to labor down. FHT cat 1 with moderate variability and accelerations.     Dr. Lenny Horne MD 6/29/2025 8:06 PM

## 2025-06-30 NOTE — LACTATION NOTE
This note was copied from a baby's chart.  CONSULT - LACTATION  Baby Girl Ellis (Fane) 1 days female MRN: [id number]    [facility] AN NURSERY Room / Bed: (N)/(N) Encounter: [id number]    Maternal Information     MOTHER:  Christian Ellis  Maternal Age: 30 y.o.  OB History: # 1 - Date: 2014, Sex: None, Weight: None, GA: None, Type: None, Apgar1: None, Apgar5: None, Living: None, Birth Comments: None    # 2 - Date: 07/10/24, Sex: None, Weight: None, GA: 9w0d, Type: None, Apgar1: None, Apgar5: None, Living: None, Birth Comments: None    # 3 - Date: 06/29/25, Sex: Female, Weight: 3555 g (7 lb 13.4 oz), GA: 40w2d, Type: Vaginal, Spontaneous, Apgar1: 8, Apgar5: 9, Living: Living, Birth Comments: None   Previous breast reduction surgery? No    Lactation history:   Has patient previously breast fed: No   How long had patient previously breast fed:     Previous breast feeding complications:       Past Surgical History:   Procedure Laterality Date    DILATION AND CURETTAGE OF UTERUS  2024    TOOTH EXTRACTION         Birth information:  YOB: 2026   Time of birth: 9:48 PM   Sex: female   Delivery type: Vaginal, Spontaneous   Birth Weight: 3555 g (7 lb 13.4 oz)   Percent of Weight Change: 0%     Gestational Age: 40w2d   [unfilled]    Reason for Consult:    Reason for Consult: Follow-up assessment (ext) - 20 min, Latch Assess (ext) - 20 min, Latch Assess (routine) - 10 min    Risk Factors:         Breast and nipple assessment:   Breasts/Nipples  Left Breast: Soft, Firm, Other (Comment) (conical in shape small areolas)  Right Breast: Soft, Firm, Other (Comment) (, conical in shape small areolas)  Left Nipple: Everted, Other (Comment) (small, red, round)  Right Nipple: Everted, Other (Comment) (small, red, round)  Intervention: Lanolin, Hand expression  Breastfeeding Status: Yes  Breastfeeding Progress: Not yet established, Other issues (comment) (positioning issues,  sleepy baby)    OB Lactation Tools:    Other OB Lactation Tools  Feeding Devices: Lanolin    Breast Pump:    Breast Pump  Pump: Personal      [place] Assessment: spitty after birth; high, posterior suck reflex    Feeding assessment: latch difficulty (due to positioning at the breast and enc. Mom to tug down baby's chin to achieve a wider latch; ed. On nipple to nose, not nipple to mouth)  LATCH:  Latch: Repeated attempts, hold nipple in mouth, stimulate to suck   Audible Swallowing: A few with stimulation   Type of Nipple: Everted (After stimulation)   Comfort (Breast/Nipple): Soft/non-tender   Hold (Positioning): Partial assist, teach one side, mother does other, staff holds   LATCH Score: 7         Feeding recommendations:  breast feed on demand. Mom desires to breastfeed. Mom states baby has been sleepy. Mom has a large number of family in the room.     Discussion of General Lactation Issues: mom wants to breastfeed and anxious about feedings  First nursing/attempt < 1 hour after birth:unknown   Skin to skin following delivery:unknown   Full term:yes - 40w 2d       Interval Breastfeeding History:    Frequency of breast feeding: attempts throughout the day. Mom has large amount of family in the room  Does mother feel breastfeeding is effective: No  Does infant appear satisfied after nursing:Yes      Mother Assessment:  Breast: small, conical shaped breasts - soft / firm to the touch; small, red areolas at the base of the nipple; slight bulbous in shape  Nipple Assessment in General: small, everted, round  Mother's Awareness of Feeding Cues                 Recognizes:no attempted every few hrs.                  Verbalizes: No  Support System: family in the room  History of Breastfeeding: none  Changes/Stressors/Violence: desires baby to feed  Concerns/Goals: anxious about baby not eating      Infant Assessment:  Behaviors: sleepy due to large family in the room; full clothing and not demonstrating feeding  cues    General Appearance:  Alert, active, no distress                            Head:  Normocephalic, AFOF, sutures opposed                            Eyes:   Conjunctiva clear, no drainage                            Ears:   Normally placed, no anomolies                           Nose:   Septum intact, no drainage or erythema                          Mouth:  No lesions, tongue remains elevated due to spittiness; wide oral gape with compression of chin on the breast; palate presents wnl: suck reflex is deeper than mom has offered nipples                     Latch Assessment:  Efficiency: cradle/ cross cradle              Lips Flanged: No              Depth of latch: shallow or not at all              Audible Swallow: No              Visible Milk: No              Wide Open/ Asymmetrical: No              Suck Swallow Cycle: Breathing: yes, Coordinated: no  Nipple Assessment after latch: Normal: elongated/eraser, no discoloration and no damage noted.  Latch Problems: no HE prior to latching; not supporting baby to the breast - mom is not supported to hold the baby; not s2s; nipple to mouth, not to nose    Positional Assessment:  Infant's Ergonomics/Body               Body Alignment: No               Head Supported: No               Close to Mom's body/ Lifted/ Supported: No               Mom's Ergonomics/Body: No                           Supported: Yes, with boppi, not mom's back or feet; using her toes to support against the floor; hunching over, taking firm breast to baby                           Sitting Back: No                           Brings Baby to her breast: No  Positioning Problems: alignment; support for mom; nipple to baby; enc. Snug hold of baby at the breast; enc. Ear, shoulder hip alignment and s2s.    [place] Latch After Lactation Education/Consult:  Efficiency: cross cradle / side lying              Lips Flanged: Yes, better with deeper latch and cheeks touching the breast              Depth of  latch: deep with hand on hand demonstration and teach back              Audible Swallow: No              Visible Milk: No              Wide Open/ Asymmetrical: Yes, with finger on the chin to assist in baby opening wide; nipple to nose, HE prior to latch              Suck Swallow Cycle: Breathing: yes, Coordinated: yes a few coord. Sucks on the right breast, then baby loses the latch  Nipple Assessment after latch: Normal: elongated/eraser, no discoloration and no damage noted.  Latch Problems: alignment; how to achieve a deep latch; nipple to nose, take baby to breast, not breast to baby    Position After Lactation Education/Consult:  Infant's Ergonomics/Body               Body Alignment: Yes, with demonstration                Head Supported: Yes, enc. Snug hold                Close to Mom's body/ Lifted/ Supported: Yes, belly to belly                Mom's Ergonomics/Body: Yes, with pillows to support                           Supported: Yes                           Sitting Back: Yes                           Brings Baby to her breast: Yes  Positioning Problems: alignment; spitty; end. On deep latch and nipple to hit the palate, not jst placed in the mouth    Personal Pump            Type: from ins.    Lanolin: Yes, due to tender nipple on the left from shallow latched     Attempted to latch on each breast without hand expression for 10 min. On the right breast, baby gave a few sucks, then unlatched. Little cousins are in the bed with mom and baby. Cousins are holding the baby's hand, talking to the baby.     Ed. On NNS, and the role of colostrum. Ed. On s2s and how to est. Milk supply. Enc. To look for early feeding cues. Mom states the baby latches better overnight and is more awake. Enc. Breast compressions, keeping the baby s2s, and having family wait so milk supply can be established.     Feeding Plan:     Mom is encouraged to place baby skin to skin for feedings. Skin to skin education provided for baby  "placement on mother's chest, baby only in diaper, blankets below shoulders on baby's back. Skin to skin is encouraged to continue at home for feedings and between feedings.    Information on hand expression given. Discussed benefits of knowing how to manually express breast including stimulating milk supply, softening nipple for latch and evacuating breast in the event of engorgement.    Education on positioning and alignment. Mom is encouraged to:     - Bring baby up to the breast (use of pillows to elevate so baby's torso is against mom's breasts)   - Skin to skin for feedings with top hand exposed to show signs of satiation   - Chin deep into breast tissue (make baby look up to the nipple)   - nose aligned to the nipple   -Wait for wide gape, place chin behind the areola on the breast so nipple is at the nose. Once baby opens their mouth wide, the nipple will be aimed at the upper, back palate  - Cheeks should be touching breast, and baby should have a long neck   - Ear, shoulder, hip will be in alignment   - Deep, snug hold of baby up to the breast   - Every baby knows how to breathe at the breast. The tip of the nose may appear to touch the breast. Babies breathe from the side of the nasal passages. If baby can not breathe due to improper alignment, baby will unlatch    Education on creating a snug hold of your infant to the breast by verifying the infant's cheek is touching the breast, your infant's chin is deep into the breast tissue, your infant's arms are \"hugging\" the breast, and your infant's lips are flanged on the areola. Bring infant to the breast, not your breast to the infant. Latch should feel like a tugging sensation on the nipple.    Demonstrated with teach back breast compressions during a feeding to increase milk transfer and stimulate suckling after a breathing/muscle break.     To assist with deep latch to the breast with a visible recessed chin,have baby's chin pressed deeply into the breast " tissue. Use compression with hand between the shoulder blades to aid in active, coordinated jaw movement.     Discussed 2nd night syndrome and ways to calm infant. Hand out given. Information on hand expression given. Discussed benefits of knowing how to manually express breast including stimulating milk supply, softening nipple for latch and evacuating breast in the event of engorgement.        (Scan QR code for [employer] - positions)   Review [employer] on youtube or scan QR code for [employer] video      Milk [employer]        [email or web address] - positions    Christy Hume, MA 6/30/2025 5:53 PM

## 2025-07-01 VITALS
HEIGHT: 62 IN | RESPIRATION RATE: 18 BRPM | SYSTOLIC BLOOD PRESSURE: 111 MMHG | DIASTOLIC BLOOD PRESSURE: 65 MMHG | BODY MASS INDEX: 27.23 KG/M2 | TEMPERATURE: 97.6 F | HEART RATE: 100 BPM | WEIGHT: 148 LBS | OXYGEN SATURATION: 100 %

## 2025-07-01 PROCEDURE — NC001 PR NO CHARGE: Performed by: OBSTETRICS & GYNECOLOGY

## 2025-07-01 PROCEDURE — 99024 POSTOP FOLLOW-UP VISIT: CPT | Performed by: OBSTETRICS & GYNECOLOGY

## 2025-07-01 RX ORDER — BENZOCAINE/MENTHOL 6 MG-10 MG
1 LOZENGE MUCOUS MEMBRANE DAILY PRN
Start: 2025-07-01

## 2025-07-01 RX ORDER — IBUPROFEN 600 MG/1
600 TABLET, FILM COATED ORAL EVERY 6 HOURS SCHEDULED
Start: 2025-07-01

## 2025-07-01 RX ORDER — ACETAMINOPHEN 325 MG/1
975 TABLET ORAL EVERY 6 HOURS SCHEDULED
Start: 2025-07-01

## 2025-07-01 RX ADMIN — DOCUSATE SODIUM 100 MG: 100 CAPSULE, LIQUID FILLED ORAL at 09:13

## 2025-07-01 RX ADMIN — IBUPROFEN 600 MG: 600 TABLET, FILM COATED ORAL at 07:36

## 2025-07-01 RX ADMIN — WITCH HAZEL 1 PAD: 500 SOLUTION RECTAL; TOPICAL at 06:35

## 2025-07-01 RX ADMIN — BENZOCAINE AND LEVOMENTHOL 1 APPLICATION: 200; 5 SPRAY TOPICAL at 06:35

## 2025-07-01 NOTE — PROGRESS NOTES
Progress Note - OB/GYN   Name: Christian Ellis 30 y.o. female I MRN: [id number]  Unit/Bed#: -01 I Date of Admission: 2025   Date of Service: 2025 I Hospital Day: 2     Assessment & Plan   (spontaneous vaginal delivery)  Continue routine post partum care  Encourage ambulation  Encourage breastfeeding  Anticipate discharge PPD 2       OB Post-Partum Progress Note  Subjective   Post delivery. PPD 2. Patient is doing well. Lochia WNL. Pain well controlled.     Pain: minimal  Tolerating PO: yes  Voiding: yes  Flatus: yes  Ambulating: yes  Breastfeeding:  yes  Chest pain: no  Shortness of breath: no  Leg pain: no  Lochia: WNL    Objective :  Temp:  [97.8 °F (36.6 °C)-99.7 °F (37.6 °C)] 98.5 °F (36.9 °C)  HR:  [] 101  BP: (101-108)/(59-76) 105/64  Resp:  [18-20] 20  SpO2:  [97 %-99 %] 97 %  O2 Device: None (Room air)    Physical Exam  Vitals reviewed.   Constitutional:       General: She is not in acute distress.     Appearance: Normal appearance.   HENT:      Head: Normocephalic and atraumatic.     Eyes:      Extraocular Movements: Extraocular movements intact.       Cardiovascular:      Rate and Rhythm: Normal rate.   Pulmonary:      Effort: Pulmonary effort is normal. No respiratory distress.   Abdominal:      Comments: Fundus firm at 1 cm below umbilicus     Skin:     General: Skin is warm and dry.     Neurological:      Mental Status: She is alert.     Psychiatric:         Mood and Affect: Mood normal.         Behavior: Behavior normal.           Lab Results: I have reviewed the following results:  Lab Results   Component Value Date    WBC 17.08 (H) 2025    HGB 13.8 2025    HCT 38.8 2025    MCV 93 2025     2025

## 2025-07-01 NOTE — DISCHARGE INSTR - AVS FIRST PAGE
Vaginal Delivery   WHAT YOU SHOULD KNOW:   A vaginal delivery is the birth of your baby through your vagina (birth canal).        AFTER YOU LEAVE:   Medicines:  NSAIDs  help decrease swelling and pain or fever. This medicine is available with or without a doctor's order. NSAIDs can cause stomach bleeding or kidney problems in certain people. If you take blood thinner medicine, always ask your healthcare provider if NSAIDs are safe for you. Always read the medicine label and follow directions.    Take your medicine as directed.  Call your healthcare provider if you think your medicine is not helping or if you have side effects. Tell him if you are allergic to any medicine. Keep a list of the medicines, vitamins, and herbs you take. Include the amounts, and when and why you take them. Bring the list or the pill bottles to follow-up visits. Carry your medicine list with you in case of an emergency.  Follow up with your primary healthcare provider:  Most women need to return 6 weeks after a vaginal delivery. Ask about how to care for your wounds or stitches. Write down your questions so you remember to ask them during your visits.  Activity:  Rest as much as possible. Try to keep all activities short. You may be able to do some exercise soon after you have your baby. Talk with your primary healthcare provider before you start exercising. If you work outside the home, ask when you can return to your job.  Kegel exercises:  Kegel exercises may help your vaginal and rectal muscles heal faster. You can do Kegel exercises by tightening and relaxing the muscles around your vagina. Kegel exercises help make the muscles stronger.   Breast care:  When your milk comes in, your breasts may feel full and hard. Ask how to care for your breasts, even if you are not breastfeeding.   Constipation:  Do not try to push the bowel movement out if it is too hard. High-fiber foods, extra liquids, and regular exercise can help you prevent  constipation. Examples of high-fiber foods are fruit and bran. Prune juice and water are good liquids to drink. Regular exercise helps your digestive system work. You may also be told to take over-the-counter fiber and stool softener medicines. Take these items as directed.   Hemorrhoids:  Pregnancy can cause severe hemorrhoids. You may have rectal pain because of the hemorrhoids. Ask how to prevent or treat hemorrhoids.  Perineum care:  Your perineum is the area between your vagina and anus. Keep the area clean and dry to help it heal and to prevent infection. Wash the area gently with soap and water when you bathe or shower. Rinse your perineum with warm water when you use the toilet. Your primary healthcare provider may suggest you use a warm sitz bath to help decrease pain. A sitz bath is a bathtub or basin filled to hip level. Stay in the sitz bath for 20 to 30 minutes, or as directed.   Vaginal discharge:  You will have vaginal discharge, called lochia, after your delivery. The lochia is bright red the first day or two after the birth. By the fourth day, the amount decreases, and it turns red-brown. Use a sanitary pad rather than a tampon to prevent a vaginal infection. It is normal to have lochia up to 8 weeks after your baby is born.   Monthly periods:  Your period may start again within 7 to 12 weeks after your baby is born. If you are breastfeeding, it may take longer for your period to start again. You can still get pregnant again even though you do not have your monthly period. Talk with your primary healthcare provider about a birth control method that will be good for you if you do not want to get pregnant.  Mood changes:  Many new mothers have some kind of mood changes after delivery. Some of these changes occur because of lack of sleep, hormone changes, and caring for a new baby. Some mood changes can be more serious, such as postpartum depression. Talk with your primary healthcare provider if you  feel unable to care for yourself or your baby.  Sexual activity:  You may need to avoid sex for 6 to 7 weeks after you have your baby. You may notice you have a decreased desire for sex, or sex may be painful. You may need to use a vaginal lubricant (gel) to help make sex more comfortable.  Contact your primary healthcare provider if:   You have heavy vaginal bleeding that fills 1 or more sanitary pads in 1 hour.    You have a fever.    Your pain does not go away, or gets worse.    The skin between your vagina and rectum is swollen, warm, or red.    You have swollen, hard, or painful breasts.    You feel very sad or depressed.    You feel more tired than usual.     You have questions or concerns about your condition or care.  Seek care immediately or call 911 if:   You have pus or yellow drainage coming from your vagina or wound.    You are urinating very little, or not at all.    Your arm or leg feels warm, tender, and painful. It may look swollen and red.    You feel lightheaded, have sudden and worsening chest pain, or trouble breathing. You may have more pain when you take deep breaths or cough, or you may cough up blood.  © 2014 [employer] Information is for End User's use only and may not be sold, redistributed or otherwise used for commercial purposes. All illustrations and images included in CareNotes® are the copyrighted property of [employer] or [employer].  The above information is an  only. It is not intended as medical advice for individual conditions or treatments. Talk to your doctor, nurse or pharmacist before following any medical regimen to see if it is safe and effective for you.

## 2025-07-01 NOTE — PLAN OF CARE
Problem: PAIN - ADULT  Goal: Verbalizes/displays adequate comfort level or baseline comfort level  Description: Interventions:  - Encourage patient to monitor pain and request assistance  - Assess pain using appropriate pain scale  - Administer analgesics as ordered based on type and severity of pain and evaluate response  - Implement non-pharmacological measures as appropriate and evaluate response  - Consider cultural and social influences on pain and pain management  - Notify physician/advanced practitioner if interventions unsuccessful or patient reports new pain  - Educate patient/family on pain management process including their role and importance of  reporting pain   - Provide non-pharmacologic/complimentary pain relief interventions  Outcome: Progressing     Problem: INFECTION - ADULT  Goal: Absence or prevention of progression during hospitalization  Description: INTERVENTIONS:  - Assess and monitor for signs and symptoms of infection  - Monitor lab/diagnostic results  - Monitor all insertion sites, i.e. indwelling lines, tubes, and drains  - Monitor endotracheal if appropriate and nasal secretions for changes in amount and color  - [place] appropriate cooling/warming therapies per order  - Administer medications as ordered  - Instruct and encourage patient and family to use good hand hygiene technique  - Identify and instruct in appropriate isolation precautions for identified infection/condition  Outcome: Progressing  Goal: Absence of fever/infection during neutropenic period  Description: INTERVENTIONS:  - Monitor WBC  - Perform strict hand hygiene  - Limit to healthy visitors only  - No plants, dried, fresh or silk flowers with emery in patient room  Outcome: Progressing     Problem: SAFETY ADULT  Goal: Patient will remain free of falls  Description: INTERVENTIONS:  - Educate patient/family on patient safety including physical limitations  - Instruct patient to call for assistance with activity   -  Consider consulting OT/PT to assist with strengthening/mobility based on AM PAC & JH-HLM score  - Consult OT/PT to assist with strengthening/mobility   - Keep Call bell within reach  - Keep bed low and locked with side rails adjusted as appropriate  - Keep care items and personal belongings within reach  - Initiate and maintain comfort rounds  - Apply yellow socks and bracelet for high fall risk patients  - Consider moving patient to room near nurses station  Outcome: Progressing  Goal: Maintain or return to baseline ADL function  Description: INTERVENTIONS:  -  Assess patient's ability to carry out ADLs; assess patient's baseline for ADL function and identify physical deficits which impact ability to perform ADLs (bathing, care of mouth/teeth, toileting, grooming, dressing, etc.)  - Assess/evaluate cause of self-care deficits   - Assess range of motion  - Assess patient's mobility; develop plan if impaired  - Assess patient's need for assistive devices and provide as appropriate  - Encourage maximum independence but intervene and supervise when necessary  - Involve family in performance of ADLs  - Assess for home care needs following discharge   - Consider OT consult to assist with ADL evaluation and planning for discharge  - Provide patient education as appropriate  - Monitor functional capacity and physical performance, use of AM PAC & JH-HLM   - Monitor gait, balance and fatigue with ambulation    Outcome: Progressing  Goal: Maintains/Returns to pre admission functional level  Description: INTERVENTIONS:  - Perform AM-PAC 6 Click Basic Mobility/ Daily Activity assessment daily.  - Set and communicate daily mobility goal to care team and patient/family/caregiver.   - Collaborate with rehabilitation services on mobility goals if consulted  - Out of bed for toileting  - Record patient progress and toleration of activity level   Outcome: Progressing     Problem: DISCHARGE PLANNING  Goal: Discharge to home or other  facility with appropriate resources  Description: INTERVENTIONS:  - Identify barriers to discharge w/patient and caregiver  - Arrange for needed discharge resources and transportation as appropriate  - Identify discharge learning needs (meds, wound care, etc.)  - Arrange for interpretive services to assist at discharge as needed  - Refer to Case Management Department for coordinating discharge planning if the patient needs post-hospital services based on physician/advanced practitioner order or complex needs related to functional status, cognitive ability, or social support system  Outcome: Progressing     Problem: POSTPARTUM  Goal: Experiences normal postpartum course  Description: INTERVENTIONS:  - Monitor maternal vital signs  - Assess uterine involution and lochia  Outcome: Progressing  Goal: Appropriate maternal -  bonding  Description: INTERVENTIONS:  - Identify family support  - Assess for appropriate maternal/infant bonding   -Encourage maternal/infant bonding opportunities  - Referral to  or  as needed  Outcome: Progressing  Goal: Establishment of infant feeding pattern  Description: INTERVENTIONS:  - Assess breast/bottle feeding  - Refer to lactation as needed  Outcome: Progressing  Goal: Incision(s), wounds(s) or drain site(s) healing without S/S of infection  Description: INTERVENTIONS  - Assess and document dressing, incision, wound bed, drain sites and surrounding tissue  - Provide patient and family educationOutcome: Progressing

## 2025-07-01 NOTE — PLAN OF CARE
Problem: PAIN - ADULT  Goal: Verbalizes/displays adequate comfort level or baseline comfort level  Description: Interventions:  - Encourage patient to monitor pain and request assistance  - Assess pain using appropriate pain scale  - Administer analgesics as ordered based on type and severity of pain and evaluate response  - Implement non-pharmacological measures as appropriate and evaluate response  - Consider cultural and social influences on pain and pain management  - Notify physician/advanced practitioner if interventions unsuccessful or patient reports new pain  - Educate patient/family on pain management process including their role and importance of  reporting pain   - Provide non-pharmacologic/complimentary pain relief interventions  Outcome: Progressing     Problem: INFECTION - ADULT  Goal: Absence or prevention of progression during hospitalization  Description: INTERVENTIONS:  - Assess and monitor for signs and symptoms of infection  - Monitor lab/diagnostic results  - Monitor all insertion sites, i.e. indwelling lines, tubes, and drains  - Monitor endotracheal if appropriate and nasal secretions for changes in amount and color  - [place] appropriate cooling/warming therapies per order  - Administer medications as ordered  - Instruct and encourage patient and family to use good hand hygiene technique  - Identify and instruct in appropriate isolation precautions for identified infection/condition  Outcome: Progressing  Goal: Absence of fever/infection during neutropenic period  Description: INTERVENTIONS:  - Monitor WBC  - Perform strict hand hygiene  - Limit to healthy visitors only  - No plants, dried, fresh or silk flowers with emery in patient room  Outcome: Progressing     Problem: SAFETY ADULT  Goal: Patient will remain free of falls  Description: INTERVENTIONS:  - Educate patient/family on patient safety including physical limitations  - Instruct patient to call for assistance with activity   -  Consider consulting OT/PT to assist with strengthening/mobility based on AM PAC & JH-HLM score  - Consult OT/PT to assist with strengthening/mobility   - Keep Call bell within reach  - Keep bed low and locked with side rails adjusted as appropriate  - Keep care items and personal belongings within reach  - Initiate and maintain comfort rounds  Outcome: Progressing  Goal: Maintain or return to baseline ADL function  Description: INTERVENTIONS:  -  Assess patient's ability to carry out ADLs; assess patient's baseline for ADL function and identify physical deficits which impact ability to perform ADLs (bathing, care of mouth/teeth, toileting, grooming, dressing, etc.)  - Assess/evaluate cause of self-care deficits   - Assess range of motion  - Assess patient's mobility; develop plan if impaired  - Assess patient's need for assistive devices and provide as appropriate  - Encourage maximum independence but intervene and supervise when necessary  - Involve family in performance of ADLs  - Assess for home care needs following discharge   - Consider OT consult to assist with ADL evaluation and planning for discharge  - Provide patient education as appropriate  - Monitor functional capacity and physical performance, use of AM PAC & JH-HLM   - Monitor gait, balance and fatigue with ambulation    Outcome: Progressing  Goal: Maintains/Returns to pre admission functional level  Description: INTERVENTIONS:  - Perform AM-PAC 6 Click Basic Mobility/ Daily Activity assessment daily.  - Set and communicate daily mobility goal to care team and patient/family/caregiver.   - Collaborate with rehabilitation services on mobility goals if consulted  - Out of bed for toileting  - Record patient progress and toleration of activity level   Outcome: Progressing     Problem: DISCHARGE PLANNING  Goal: Discharge to home or other facility with appropriate resources  Description: INTERVENTIONS:  - Identify barriers to discharge w/patient and  caregiver  - Arrange for needed discharge resources and transportation as appropriate  - Identify discharge learning needs (meds, wound care, etc.)  - Arrange for interpretive services to assist at discharge as needed  - Refer to Case Management Department for coordinating discharge planning if the patient needs post-hospital services based on physician/advanced practitioner order or complex needs related to functional status, cognitive ability, or social support system  Outcome: Progressing     Problem: POSTPARTUM  Goal: Experiences normal postpartum course  Description: INTERVENTIONS:  - Monitor maternal vital signs  - Assess uterine involution and lochia  Outcome: Progressing  Goal: Appropriate maternal -  bonding  Description: INTERVENTIONS:  - Identify family support  - Assess for appropriate maternal/infant bonding   -Encourage maternal/infant bonding opportunities  - Referral to  or  as needed  Outcome: Progressing  Goal: Establishment of infant feeding pattern  Description: INTERVENTIONS:  - Assess breast/bottle feeding  - Refer to lactation as needed  Outcome: Progressing  Goal: Incision(s), wounds(s) or drain site(s) healing without S/S of infection  Description: INTERVENTIONS  - Assess and document dressing, incision, wound bed, drain sites and surrounding tissue  - Provide patient and family education  - Perform skin care/dressing changes every   Outcome: Progressing

## 2025-07-01 NOTE — PLAN OF CARE
Problem: PAIN - ADULT  Goal: Verbalizes/displays adequate comfort level or baseline comfort level  Description: Interventions:  - Encourage patient to monitor pain and request assistance  - Assess pain using appropriate pain scale  - Administer analgesics as ordered based on type and severity of pain and evaluate response  - Implement non-pharmacological measures as appropriate and evaluate response  - Consider cultural and social influences on pain and pain management  - Notify physician/advanced practitioner if interventions unsuccessful or patient reports new pain  - Educate patient/family on pain management process including their role and importance of  reporting pain   - Provide non-pharmacologic/complimentary pain relief interventions  7/1/2025 1041 by Suzette Witt RN  Outcome: Adequate for Discharge  7/1/2025 0754 by Suzette Witt RN  Outcome: Progressing     Problem: INFECTION - ADULT  Goal: Absence or prevention of progression during hospitalization  Description: INTERVENTIONS:  - Assess and monitor for signs and symptoms of infection  - Monitor lab/diagnostic results  - Monitor all insertion sites, i.e. indwelling lines, tubes, and drains  - Monitor endotracheal if appropriate and nasal secretions for changes in amount and color  - [place] appropriate cooling/warming therapies per order  - Administer medications as ordered  - Instruct and encourage patient and family to use good hand hygiene technique  - Identify and instruct in appropriate isolation precautions for identified infection/condition  7/1/2025 1041 by Suzette Witt RN  Outcome: Adequate for Discharge  7/1/2025 0754 by Suzette Witt RN  Outcome: Progressing  Goal: Absence of fever/infection during neutropenic period  Description: INTERVENTIONS:  - Monitor WBC  - Perform strict hand hygiene  - Limit to healthy visitors only  - No plants, dried, fresh or silk flowers with emery in patient room  7/1/2025 1041 by Suzette Witt RN  Outcome: Adequate  for Discharge  7/1/2025 0754 by Suzette Witt RN  Outcome: Progressing     Problem: SAFETY ADULT  Goal: Patient will remain free of falls  Description: INTERVENTIONS:  - Educate patient/family on patient safety including physical limitations  - Instruct patient to call for assistance with activity   - Consider consulting OT/PT to assist with strengthening/mobility based on AM PAC & JH-HLM score  - Consult OT/PT to assist with strengthening/mobility   - Keep Call bell within reach  - Keep bed low and locked with side rails adjusted as appropriate  - Keep care items and personal belongings within reach  - Initiate and maintain comfort rounds  - Make Fall Risk Sign visible to staff  - Apply yellow socks and bracelet for high fall risk patients  - Consider moving patient to room near nurses station  7/1/2025 1041 by Suzette Witt RN  Outcome: Adequate for Discharge  7/1/2025 0754 by Suzette Witt RN  Outcome: Progressing  Goal: Maintain or return to baseline ADL function  Description: INTERVENTIONS:  -  Assess patient's ability to carry out ADLs; assess patient's baseline for ADL function and identify physical deficits which impact ability to perform ADLs (bathing, care of mouth/teeth, toileting, grooming, dressing, etc.)  - Assess/evaluate cause of self-care deficits   - Assess range of motion  - Assess patient's mobility; develop plan if impaired  - Assess patient's need for assistive devices and provide as appropriate  - Encourage maximum independence but intervene and supervise when necessary  - Involve family in performance of ADLs  - Assess for home care needs following discharge   - Consider OT consult to assist with ADL evaluation and planning for discharge  - Provide patient education as appropriate  - Monitor functional capacity and physical performance, use of AM PAC & JH-HLM   - Monitor gait, balance and fatigue with ambulation    7/1/2025 1041 by Suzette Witt RN  Outcome: Adequate for Discharge  7/1/2025 0754  by Suzette Witt RN  Outcome: Progressing  Goal: Maintains/Returns to pre admission functional level  Description: INTERVENTIONS:  - Perform AM-PAC 6 Click Basic Mobility/ Daily Activity assessment daily.  - Set and communicate daily mobility goal to care team and patient/family/caregiver.   - Collaborate with rehabilitation services on mobility goals if consulted  - Out of bed for toileting  - Record patient progress and toleration of activity level   2025 1041 by Suzette Witt RN  Outcome: Adequate for Discharge  2025 0754 by Suzette Witt RN  Outcome: Progressing     Problem: DISCHARGE PLANNING  Goal: Discharge to home or other facility with appropriate resources  Description: INTERVENTIONS:  - Identify barriers to discharge w/patient and caregiver  - Arrange for needed discharge resources and transportation as appropriate  - Identify discharge learning needs (meds, wound care, etc.)  - Arrange for interpretive services to assist at discharge as needed  - Refer to Case Management Department for coordinating discharge planning if the patient needs post-hospital services based on physician/advanced practitioner order or complex needs related to functional status, cognitive ability, or social support system  2025 1041 by Suzette Witt RN  Outcome: Adequate for Discharge  2025 0754 by Suzette Witt RN  Outcome: Progressing     Problem: POSTPARTUM  Goal: Experiences normal postpartum course  Description: INTERVENTIONS:  - Monitor maternal vital signs  - Assess uterine involution and lochia  2025 1041 by Suzette Witt RN  Outcome: Adequate for Discharge  2025 0754 by Suzette Witt RN  Outcome: Progressing  Goal: Appropriate maternal -  bonding  Description: INTERVENTIONS:  - Identify family support  - Assess for appropriate maternal/infant bonding   -Encourage maternal/infant bonding opportunities  - Referral to  or  as needed  2025 1041 by Suzette Witt RN  Outcome:  Adequate for Discharge  7/1/2025 0754 by Suzette Witt RN  Outcome: Progressing  Goal: Establishment of infant feeding pattern  Description: INTERVENTIONS:  - Assess breast/bottle feeding  - Refer to lactation as needed  7/1/2025 1041 by Suzette Witt RN  Outcome: Adequate for Discharge  7/1/2025 0754 by Suzette Witt RN  Outcome: Progressing  Goal: Incision(s), wounds(s) or drain site(s) healing without S/S of infection  Description: INTERVENTIONS  - Assess and document dressing, incision, wound bed, drain sites and surrounding tissue  - Provide patient and family education  7/1/2025 1041 by Suzette Witt RN  Outcome: Adequate for Discharge  7/1/2025 0754 by Suzette Witt RN  Outcome: Progressing

## 2025-07-01 NOTE — ASSESSMENT & PLAN NOTE
Continue routine post partum care  Encourage ambulation  Encourage breastfeeding  Anticipate discharge PPD 2

## 2025-07-02 ENCOUNTER — TELEPHONE (OUTPATIENT)
Age: 30
End: 2025-07-02

## 2025-07-02 NOTE — UTILIZATION REVIEW
NOTIFICATION OF ADMISSION DISCHARGE   This is a Notification of Discharge from [facility]. Please be advised that this patient has been discharge from our facility. Below you will find the admission and discharge date and time including the patient’s disposition.   UTILIZATION REVIEW CONTACT:  Utilization Review Assistants  Network Utilization Review Department  Phone: [phone] x carefully listen to the prompts. All voicemails are confidential.  Email: [email or web address]     ADMISSION INFORMATION  PRESENTATION DATE: 6/29/2025  1:21 AM  OBERVATION ADMISSION DATE: N/A  INPATIENT ADMISSION DATE: 6/29/25  6:51 AM   DISCHARGE DATE: 7/1/2025 12:00 PM   DISPOSITION:Home/Self Care    Network Utilization Review Department  ATTENTION: Please call with any questions or concerns to [phone] and carefully listen to the prompts so that you are directed to the right person. All voicemails are confidential.   For Discharge needs, contact Care Management DC Support Team at [phone] opt. 2  Send all requests for admission clinical reviews, approved or denied determinations and any other requests to dedicated fax number below belonging to the campus where the patient is receiving treatment. List of dedicated fax numbers for the Facilities:  FACILITY NAME UR FAX NUMBER   ADMISSION DENIALS (Administrative/Medical Necessity) [phone]   DISCHARGE SUPPORT TEAM ([facility]) [phone]   PARENT CHILD HEALTH (Maternity/NICU/Pediatrics) [phone]   [facility] [phone]   [facility] [phone]   [facility] [phone]   [facility] [phone]   [facility] [phone]   [facility] [phone]   [facility] [phone]   [facility] [phone]   [facility]  [facility] [phone]   [facility] [phone]   [facility] [phone]   [facility] [phone]

## 2025-07-02 NOTE — CONSULTS
This note was copied from a baby's chart.  Mom left prior to seeing lactation for d/c.       Inpatient Lactation consult is being closed due to lactation services provided. Please refer to previous Lactation notes to see feeding plan for the breastfeeding dyad.     Please reach out to [facility] for an outpatient lactation appointment, as needed. You may contact them at [phone].

## 2025-07-02 NOTE — TELEPHONE ENCOUNTER
Patient was called for PP follow up.  Left a voicemail.  Patient was reminded that she can message this nurse via My Chart and If  having any symptoms or concerns to Please call [phone].    AGATHA Leary  OB Nurse Navigator

## 2025-07-05 LAB — PLACENTA IN STORAGE: NORMAL

## 2025-07-07 ENCOUNTER — TELEPHONE (OUTPATIENT)
Age: 30
End: 2025-07-07

## 2025-07-07 NOTE — TELEPHONE ENCOUNTER
L/M for patient to call back and confirm appt for 7/23. Please cancel 8/4 appt once patient confirms

## 2025-07-07 NOTE — TELEPHONE ENCOUNTER
Patient called in to schedule her PP appt. Patient delviered 6/29/25. PP Appt made for 8/4. Making sure this is an okay timeframe based off availability. Please advise  Thank You

## 2025-07-08 ENCOUNTER — TELEPHONE (OUTPATIENT)
Age: 30
End: 2025-07-08

## 2025-07-08 NOTE — TELEPHONE ENCOUNTER
Patient was called for PP follow up.  Left a voicemail.  Patient was reminded that she can message this nurse via My Chart and If  having any symptoms or concerns to Please call [phone].    AGATHA Leary  OB Nurse Navigator

## 2025-07-21 NOTE — PROGRESS NOTES
Name: Christian Ellis      : 1995      MRN: [id number]  Encounter Provider: Eva Duncan PA-C  Encounter Date: 2025   Encounter department: [facility] OBSTETRICS & GYNECOLOGY [facility]  :  Assessment & Plan  Encounter for postpartum visit     Postpartum visit.   May gradually resume normal activities.  Continue prenatal vitamin daily while breastfeeding or until you've run out, if formula feeding.   Consider taking prenatal vitamin 6-12 months before a future pregnancy to reduce risk of neural tube defect.  Recommended to start contraception at 6 weeks unless otherwise directed.   Resume sexual activity once comfortable and protected to do so. If breastfeeding, may need to use a lubricant with sex due to vaginal dryness.   If symptoms of depression occur, please call the office to schedule an appt. This may happen up until your baby's first birthday.  Return to office in 3-4 months for yearly exam, sooner as needed.          History of Present Illness   HPI  Christian Ellis is a 30 y.o. female here for postpartum visit.  She is s/p Uncomplicated vaginal delivery on 25 .  Required a second-degree laceration repair     Gender: female   Apgars: A1 - 8 A5-9  Weight: 7lb 13.4 oz   Her lochia has resolved.    She is Breastfeeding without problems.   She denies postpartum blues/depression.  Her EPDS is 0.    Substance use screen: 0    Last Pap: 2022    She declined birth control at this time      Review of Systems   Constitutional:  Negative for chills, fatigue and fever.   Respiratory:  Negative for shortness of breath.    Cardiovascular:  Negative for chest pain.   Gastrointestinal:  Negative for abdominal pain, anal bleeding, blood in stool, constipation, diarrhea, nausea and vomiting.   Genitourinary:  Negative for difficulty urinating, dysuria, frequency, hematuria, pelvic pain, urgency, vaginal bleeding, vaginal discharge and vaginal pain.   Neurological:  Negative for  headaches.   Psychiatric/Behavioral:  Negative for dysphoric mood. The patient is not nervous/anxious.      Medications Ordered Prior to Encounter[1]   Social History[2]     Objective   /70   Wt 55.4 kg (122 lb 3.2 oz)   LMP  (LMP Unknown)   BMI 22.34 kg/m²      Physical Exam  Vitals and nursing note reviewed.   Constitutional:       General: She is not in acute distress.     Appearance: She is well-developed.   HENT:      Head: Normocephalic and atraumatic.     Eyes:      Extraocular Movements: Extraocular movements intact.     Pulmonary:      Effort: Pulmonary effort is normal.   Chest:   Breasts:     Right: No swelling, bleeding, inverted nipple, mass, nipple discharge, skin change or tenderness.      Left: No swelling, bleeding, inverted nipple, mass, nipple discharge, skin change or tenderness.   Abdominal:      Palpations: Abdomen is soft.      Tenderness: There is no abdominal tenderness.      Hernia: There is no hernia in the left inguinal area or right inguinal area.   Genitourinary:     General: Normal vulva.      Exam position: Lithotomy position.      Pubic Area: No rash.       Labia:         Right: No rash, tenderness or lesion.         Left: No rash, tenderness or lesion.       Urethra: No urethral pain or urethral swelling.      Vagina: No vaginal discharge, erythema, tenderness, bleeding or lesions.      Cervix: No cervical motion tenderness, discharge, friability, lesion, erythema or cervical bleeding.      Uterus: Not enlarged and not tender.       Adnexa:         Right: No mass, tenderness or fullness.          Left: No mass, tenderness or fullness.        Comments: Incisions are clean dry and intact, healing well  Lymphadenopathy:      Upper Body:      Right upper body: No supraclavicular, axillary or pectoral adenopathy.      Left upper body: No supraclavicular, axillary or pectoral adenopathy.      Lower Body: No right inguinal adenopathy. No left inguinal adenopathy.     Skin:      General: Skin is warm and dry.     Neurological:      Mental Status: She is alert.     Psychiatric:         Mood and Affect: Mood normal.         Behavior: Behavior normal.         Eva Duncan PA-C         [1]   Current Outpatient Medications on File Prior to Visit   Medication Sig Dispense Refill    acetaminophen (TYLENOL) 325 mg tablet Take 3 tablets (975 mg total) by mouth every 6 (six) hours      benzocaine-menthol-lanolin-aloe (DERMOPLAST) 20-0.5 % topical spray Apply 1 Application topically every 6 (six) hours as needed for mild pain or irritation      hydrocortisone 1 % cream Apply 1 Application topically daily as needed for irritation      ibuprofen (MOTRIN) 600 mg tablet Take 1 tablet (600 mg total) by mouth every 6 (six) hours      Omega 3-6-9 Fatty Acids (OMEGA DHA PO) Take 2 tablets by mouth in the morning      Prenatal Vit-DSS-Fe Cbn-FA (PRENATAL AD PO) Take 3 tablets by mouth in the morning      witch hazel-glycerin (TUCKS) topical pad Apply 1 Pad topically every 4 (four) hours as needed for irritation       No current facility-administered medications on file prior to visit.   [2]   Social History  Tobacco Use    Smoking status: Never    Smokeless tobacco: Never   Vaping Use    Vaping status: Never Used   Substance and Sexual Activity    Alcohol use: Never    Drug use: Never    Sexual activity: Yes     Partners: Male     Birth control/protection: None

## 2025-07-23 ENCOUNTER — POSTPARTUM VISIT (OUTPATIENT)
Age: 30
End: 2025-07-23

## 2025-07-23 VITALS — DIASTOLIC BLOOD PRESSURE: 70 MMHG | BODY MASS INDEX: 22.34 KG/M2 | WEIGHT: 122.2 LBS | SYSTOLIC BLOOD PRESSURE: 102 MMHG

## 2025-07-23 PROCEDURE — 99024 POSTOP FOLLOW-UP VISIT: CPT
